# Patient Record
Sex: FEMALE | Race: WHITE | NOT HISPANIC OR LATINO | Employment: UNEMPLOYED | URBAN - NONMETROPOLITAN AREA
[De-identification: names, ages, dates, MRNs, and addresses within clinical notes are randomized per-mention and may not be internally consistent; named-entity substitution may affect disease eponyms.]

---

## 2017-02-23 ENCOUNTER — OFFICE VISIT (OUTPATIENT)
Dept: ORTHOPEDIC SURGERY | Facility: CLINIC | Age: 47
End: 2017-02-23

## 2017-02-23 DIAGNOSIS — M75.42 IMPINGEMENT SYNDROME OF BOTH SHOULDERS: Primary | ICD-10-CM

## 2017-02-23 DIAGNOSIS — M75.41 IMPINGEMENT SYNDROME OF BOTH SHOULDERS: Primary | ICD-10-CM

## 2017-02-23 DIAGNOSIS — G56.01 CARPAL TUNNEL SYNDROME OF RIGHT WRIST: ICD-10-CM

## 2017-02-23 PROCEDURE — 99213 OFFICE O/P EST LOW 20 MIN: CPT | Performed by: ORTHOPAEDIC SURGERY

## 2017-12-19 ENCOUNTER — OFFICE (AMBULATORY)
Dept: URBAN - METROPOLITAN AREA CLINIC 75 | Facility: CLINIC | Age: 47
End: 2017-12-19

## 2017-12-19 VITALS
SYSTOLIC BLOOD PRESSURE: 120 MMHG | HEART RATE: 80 BPM | DIASTOLIC BLOOD PRESSURE: 80 MMHG | HEIGHT: 60 IN | WEIGHT: 147 LBS

## 2017-12-19 DIAGNOSIS — R10.13 EPIGASTRIC PAIN: ICD-10-CM

## 2017-12-19 DIAGNOSIS — R11.0 NAUSEA: ICD-10-CM

## 2017-12-19 DIAGNOSIS — Z79.1 LONG TERM (CURRENT) USE OF NON-STEROIDAL ANTI-INFLAMMATORIES: ICD-10-CM

## 2017-12-19 DIAGNOSIS — R13.19 OTHER DYSPHAGIA: ICD-10-CM

## 2017-12-19 DIAGNOSIS — K58.9 IRRITABLE BOWEL SYNDROME WITHOUT DIARRHEA: ICD-10-CM

## 2017-12-19 PROCEDURE — 99202 OFFICE O/P NEW SF 15 MIN: CPT | Performed by: INTERNAL MEDICINE

## 2017-12-19 RX ORDER — ONDANSETRON HYDROCHLORIDE 8 MG/1
24 TABLET, FILM COATED ORAL
Qty: 60 | Refills: 3 | Status: COMPLETED
Start: 2017-12-19 | End: 2023-07-25

## 2018-02-15 ENCOUNTER — OFFICE VISIT (OUTPATIENT)
Dept: ORTHOPEDIC SURGERY | Facility: CLINIC | Age: 48
End: 2018-02-15

## 2018-02-15 DIAGNOSIS — M75.42 IMPINGEMENT SYNDROME OF BOTH SHOULDERS: ICD-10-CM

## 2018-02-15 DIAGNOSIS — M75.41 IMPINGEMENT SYNDROME OF BOTH SHOULDERS: ICD-10-CM

## 2018-02-15 DIAGNOSIS — M25.511 RIGHT SHOULDER PAIN, UNSPECIFIED CHRONICITY: Primary | ICD-10-CM

## 2018-02-15 PROCEDURE — 20610 DRAIN/INJ JOINT/BURSA W/O US: CPT | Performed by: ORTHOPAEDIC SURGERY

## 2018-02-15 PROCEDURE — 99213 OFFICE O/P EST LOW 20 MIN: CPT | Performed by: ORTHOPAEDIC SURGERY

## 2018-02-15 RX ORDER — GABAPENTIN 300 MG/1
300 CAPSULE ORAL
COMMUNITY
Start: 2018-01-29 | End: 2022-06-23

## 2018-02-15 RX ADMIN — LIDOCAINE HYDROCHLORIDE 2 ML: 10 INJECTION, SOLUTION EPIDURAL; INFILTRATION; INTRACAUDAL; PERINEURAL at 15:33

## 2018-02-15 RX ADMIN — METHYLPREDNISOLONE ACETATE 160 MG: 80 INJECTION, SUSPENSION INTRA-ARTICULAR; INTRALESIONAL; INTRAMUSCULAR; SOFT TISSUE at 15:33

## 2018-02-15 NOTE — PROGRESS NOTES
Chief Complaint   Patient presents with   • Right Shoulder - Follow-up           HPI the patient is here today for a follow up of her right shoulder. The patient states that she has quite a bit of pain and discomfort of the right shoulder.  She has weakness in abduction.  Cross body activities bother her significantly.  She uses a keyboard for prolonged periods of time during the course of her daily activities.  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  In fact the patient has been using a wheelchair for extended periods of time.  She does use her upper extremities to propel herself in the wheelchair and also to assist in transfers.  This puts a lot of pressure on both her shoulders.  The right shoulder is quite symptomatic at this point.  She states that the pain is sharp and stabbing in character and based over the lateral and posterior aspects of the shoulder.  She denies any history of trauma to the shoulder.  We have discussed about rotator cuff pathology with this patient in detail.        There were no vitals filed for this visit.        Review of Systems   HENT: Negative.    Eyes: Negative.    Respiratory: Negative.    Cardiovascular: Negative.    Gastrointestinal: Negative.    Endocrine: Negative.    Genitourinary: Negative.    Musculoskeletal: Positive for joint swelling.   Skin: Negative.    Allergic/Immunologic: Negative.    Neurological: Positive for speech difficulty and weakness.   Hematological: Negative.    Psychiatric/Behavioral: Negative.            Physical Exam   Constitutional: She is oriented to person, place, and time. She appears well-nourished.   HENT:   Head: Atraumatic.   Eyes: EOM are normal.   Neck: Neck supple.   Cardiovascular: Normal rate, regular rhythm, normal heart sounds and intact distal pulses.    Pulmonary/Chest: Effort normal and breath sounds normal.   Abdominal: Bowel sounds are normal.   Musculoskeletal: She exhibits edema and  tenderness.   Neurological: She is alert and oriented to person, place, and time. She displays abnormal reflex. She exhibits abnormal muscle tone. Coordination abnormal.   Skin: Skin is dry.   Psychiatric: She has a normal mood and affect. Her behavior is normal. Judgment and thought content normal.   Vitals reviewed.          Joint/Body Part Specific Exam:  right Shoulder. Patient has signs of impingement with internal and external rotation. There is a lot of pain and tenderness for the patient over the subcromial bursa. Jamil’s sign is positive. Neer sign is positive. Forward flexion is 0-110° degrees, abduction is 0-120 degrees, external rotation is 0-30° degrees. Rotator cuff function is fairly well preserved except for the impingement at 90 degrees. Apprehension sign is negative. Axillary nerve function is well preserved. Radial artery pulses are palpable. There is no evidence of multidirectional instability. Sulcus sign is negative. Drop arm sign is negative. The patient has some ill-defined tenderness over the greater tuberosity of the humerus. The pain level is 5.      X-RAY Report:        Diagnostics:        Lyly was seen today for follow-up.    Diagnoses and all orders for this visit:    Right shoulder pain, unspecified chronicity  -     Large Joint Arthrocentesis    Impingement syndrome of both shoulders          Large Joint Arthrocentesis  Date/Time: 2/15/2018 3:33 PM  Consent given by: patient  Supporting Documentation  Indications: pain   Procedure Details  Location: shoulder - R glenohumeral  Approach: posterior  Medications administered: 2 mL lidocaine PF 1% 1 %; 160 mg methylPREDNISolone acetate 80 MG/ML                Plan:  Jacket patient's right shoulder from an anteromedial approach with a steroid.    Stretching and strengthening exercises of the upper extremity to prevent a frozen shoulder.    Avoid repetitive loading of the shoulder to minimize the possibility of rotator cuff tendinitis and  a tear.    Falls precaution.    Tablet ibuprofen 600 mg orally daily at bedtime for pain swelling and discomfort.    Nonoperative care for the shoulder discussed with the patient in great detail.    Follow-up in my office in 6 months for reevaluation and possible reinjection.

## 2018-02-27 PROBLEM — M25.511 RIGHT SHOULDER PAIN: Status: ACTIVE | Noted: 2018-02-27

## 2018-02-27 RX ORDER — METHYLPREDNISOLONE ACETATE 80 MG/ML
160 INJECTION, SUSPENSION INTRA-ARTICULAR; INTRALESIONAL; INTRAMUSCULAR; SOFT TISSUE
Status: COMPLETED | OUTPATIENT
Start: 2018-02-15 | End: 2018-02-15

## 2018-02-27 RX ORDER — LIDOCAINE HYDROCHLORIDE 10 MG/ML
2 INJECTION, SOLUTION EPIDURAL; INFILTRATION; INTRACAUDAL; PERINEURAL
Status: COMPLETED | OUTPATIENT
Start: 2018-02-15 | End: 2018-02-15

## 2018-04-06 VITALS
OXYGEN SATURATION: 96 % | HEART RATE: 72 BPM | HEART RATE: 73 BPM | HEART RATE: 80 BPM | DIASTOLIC BLOOD PRESSURE: 65 MMHG | HEART RATE: 90 BPM | SYSTOLIC BLOOD PRESSURE: 113 MMHG | RESPIRATION RATE: 25 BRPM | RESPIRATION RATE: 17 BRPM | OXYGEN SATURATION: 98 % | RESPIRATION RATE: 14 BRPM | DIASTOLIC BLOOD PRESSURE: 41 MMHG | SYSTOLIC BLOOD PRESSURE: 97 MMHG | RESPIRATION RATE: 21 BRPM | SYSTOLIC BLOOD PRESSURE: 130 MMHG | SYSTOLIC BLOOD PRESSURE: 110 MMHG | SYSTOLIC BLOOD PRESSURE: 105 MMHG | RESPIRATION RATE: 15 BRPM | HEART RATE: 92 BPM | SYSTOLIC BLOOD PRESSURE: 150 MMHG | OXYGEN SATURATION: 99 % | WEIGHT: 147 LBS | DIASTOLIC BLOOD PRESSURE: 75 MMHG | RESPIRATION RATE: 10 BRPM | SYSTOLIC BLOOD PRESSURE: 128 MMHG | RESPIRATION RATE: 12 BRPM | DIASTOLIC BLOOD PRESSURE: 67 MMHG | RESPIRATION RATE: 19 BRPM | DIASTOLIC BLOOD PRESSURE: 79 MMHG | OXYGEN SATURATION: 93 % | HEART RATE: 75 BPM | SYSTOLIC BLOOD PRESSURE: 146 MMHG | HEART RATE: 89 BPM | DIASTOLIC BLOOD PRESSURE: 63 MMHG | HEART RATE: 86 BPM | SYSTOLIC BLOOD PRESSURE: 98 MMHG | OXYGEN SATURATION: 100 % | OXYGEN SATURATION: 94 % | DIASTOLIC BLOOD PRESSURE: 69 MMHG | TEMPERATURE: 97.6 F | RESPIRATION RATE: 20 BRPM | HEART RATE: 87 BPM | DIASTOLIC BLOOD PRESSURE: 62 MMHG | TEMPERATURE: 97 F | DIASTOLIC BLOOD PRESSURE: 57 MMHG | DIASTOLIC BLOOD PRESSURE: 64 MMHG | HEART RATE: 79 BPM | RESPIRATION RATE: 18 BRPM | OXYGEN SATURATION: 92 % | SYSTOLIC BLOOD PRESSURE: 100 MMHG | SYSTOLIC BLOOD PRESSURE: 112 MMHG | RESPIRATION RATE: 16 BRPM | SYSTOLIC BLOOD PRESSURE: 106 MMHG | SYSTOLIC BLOOD PRESSURE: 104 MMHG | DIASTOLIC BLOOD PRESSURE: 48 MMHG | HEIGHT: 60 IN | DIASTOLIC BLOOD PRESSURE: 42 MMHG | OXYGEN SATURATION: 97 % | HEART RATE: 84 BPM | OXYGEN SATURATION: 95 % | SYSTOLIC BLOOD PRESSURE: 99 MMHG | HEART RATE: 77 BPM

## 2018-04-09 ENCOUNTER — AMBULATORY SURGICAL CENTER (AMBULATORY)
Dept: URBAN - METROPOLITAN AREA SURGERY 17 | Facility: SURGERY | Age: 48
End: 2018-04-09

## 2018-04-09 ENCOUNTER — OFFICE (AMBULATORY)
Dept: URBAN - METROPOLITAN AREA PATHOLOGY 4 | Facility: PATHOLOGY | Age: 48
End: 2018-04-09

## 2018-04-09 DIAGNOSIS — R13.19 OTHER DYSPHAGIA: ICD-10-CM

## 2018-04-09 DIAGNOSIS — D12.5 BENIGN NEOPLASM OF SIGMOID COLON: ICD-10-CM

## 2018-04-09 DIAGNOSIS — K63.5 POLYP OF COLON: ICD-10-CM

## 2018-04-09 DIAGNOSIS — K21.0 GASTRO-ESOPHAGEAL REFLUX DISEASE WITH ESOPHAGITIS: ICD-10-CM

## 2018-04-09 DIAGNOSIS — K29.70 GASTRITIS, UNSPECIFIED, WITHOUT BLEEDING: ICD-10-CM

## 2018-04-09 DIAGNOSIS — D12.8 BENIGN NEOPLASM OF RECTUM: ICD-10-CM

## 2018-04-09 DIAGNOSIS — R93.3 ABNORMAL FINDINGS ON DIAGNOSTIC IMAGING OF OTHER PARTS OF DI: ICD-10-CM

## 2018-04-09 DIAGNOSIS — R11.0 NAUSEA: ICD-10-CM

## 2018-04-09 DIAGNOSIS — K57.30 DIVERTICULOSIS OF LARGE INTESTINE WITHOUT PERFORATION OR ABS: ICD-10-CM

## 2018-04-09 DIAGNOSIS — R10.13 EPIGASTRIC PAIN: ICD-10-CM

## 2018-04-09 DIAGNOSIS — K31.89 OTHER DISEASES OF STOMACH AND DUODENUM: ICD-10-CM

## 2018-04-09 DIAGNOSIS — K58.9 IRRITABLE BOWEL SYNDROME WITHOUT DIARRHEA: ICD-10-CM

## 2018-04-09 LAB
GI HISTOLOGY: A. UNSPECIFIED: (no result)
GI HISTOLOGY: B. UNSPECIFIED: (no result)
GI HISTOLOGY: C. UNSPECIFIED: (no result)
GI HISTOLOGY: D. UNSPECIFIED: (no result)
GI HISTOLOGY: E. UNSPECIFIED: (no result)
GI HISTOLOGY: F. UNSPECIFIED: (no result)
GI HISTOLOGY: G. UNSPECIFIED: (no result)
GI HISTOLOGY: PDF REPORT: (no result)

## 2018-04-09 PROCEDURE — 88305 TISSUE EXAM BY PATHOLOGIST: CPT | Performed by: INTERNAL MEDICINE

## 2018-04-09 PROCEDURE — 43239 EGD BIOPSY SINGLE/MULTIPLE: CPT | Performed by: INTERNAL MEDICINE

## 2018-04-09 PROCEDURE — 43450 DILATE ESOPHAGUS 1/MULT PASS: CPT | Performed by: INTERNAL MEDICINE

## 2018-04-09 PROCEDURE — 45385 COLONOSCOPY W/LESION REMOVAL: CPT | Performed by: INTERNAL MEDICINE

## 2018-04-09 PROCEDURE — 45380 COLONOSCOPY AND BIOPSY: CPT | Mod: 59 | Performed by: INTERNAL MEDICINE

## 2018-04-09 RX ADMIN — PROPOFOL 50 MG: 10 INJECTION, EMULSION INTRAVENOUS at 13:44

## 2018-04-09 RX ADMIN — PROPOFOL 50 MG: 10 INJECTION, EMULSION INTRAVENOUS at 13:41

## 2018-04-09 RX ADMIN — PROPOFOL 50 MG: 10 INJECTION, EMULSION INTRAVENOUS at 13:28

## 2018-04-09 RX ADMIN — LIDOCAINE HYDROCHLORIDE 25 MG: 10 INJECTION, SOLUTION EPIDURAL; INFILTRATION; INTRACAUDAL; PERINEURAL at 13:16

## 2018-04-09 RX ADMIN — PROPOFOL 50 MG: 10 INJECTION, EMULSION INTRAVENOUS at 13:21

## 2018-04-09 RX ADMIN — PROPOFOL 50 MG: 10 INJECTION, EMULSION INTRAVENOUS at 13:19

## 2018-04-09 RX ADMIN — PROPOFOL 100 MG: 10 INJECTION, EMULSION INTRAVENOUS at 13:16

## 2018-04-09 RX ADMIN — PROPOFOL 25 MG: 10 INJECTION, EMULSION INTRAVENOUS at 13:37

## 2018-04-09 RX ADMIN — PROPOFOL 50 MG: 10 INJECTION, EMULSION INTRAVENOUS at 13:18

## 2018-04-09 RX ADMIN — PROPOFOL 50 MG: 10 INJECTION, EMULSION INTRAVENOUS at 13:25

## 2018-04-09 RX ADMIN — PROPOFOL 25 MG: 10 INJECTION, EMULSION INTRAVENOUS at 13:32

## 2018-09-07 ENCOUNTER — HOSPITAL ENCOUNTER (EMERGENCY)
Facility: HOSPITAL | Age: 48
Discharge: PSYCHIATRIC OR SUBSTANCE USE FACILITY | End: 2018-09-07
Attending: FAMILY MEDICINE | Admitting: FAMILY MEDICINE

## 2018-09-07 ENCOUNTER — HOSPITAL ENCOUNTER (INPATIENT)
Facility: HOSPITAL | Age: 48
LOS: 10 days | Discharge: HOME HEALTH | End: 2018-09-17
Attending: SPECIALIST | Admitting: SPECIALIST

## 2018-09-07 VITALS
RESPIRATION RATE: 16 BRPM | WEIGHT: 138 LBS | DIASTOLIC BLOOD PRESSURE: 78 MMHG | BODY MASS INDEX: 26.06 KG/M2 | HEIGHT: 61 IN | SYSTOLIC BLOOD PRESSURE: 120 MMHG | OXYGEN SATURATION: 99 % | TEMPERATURE: 98.1 F | HEART RATE: 70 BPM

## 2018-09-07 DIAGNOSIS — F33.2 SEVERE EPISODE OF RECURRENT MAJOR DEPRESSIVE DISORDER, WITHOUT PSYCHOTIC FEATURES (HCC): Primary | ICD-10-CM

## 2018-09-07 PROBLEM — G11.11: Chronic | Status: ACTIVE | Noted: 2018-09-07

## 2018-09-07 PROBLEM — J44.9 COPD (CHRONIC OBSTRUCTIVE PULMONARY DISEASE) (HCC): Chronic | Status: ACTIVE | Noted: 2018-09-07

## 2018-09-07 PROBLEM — K21.9 GERD (GASTROESOPHAGEAL REFLUX DISEASE): Chronic | Status: ACTIVE | Noted: 2018-09-07

## 2018-09-07 LAB
ALBUMIN SERPL-MCNC: 4 G/DL (ref 3.5–5.2)
ALBUMIN/GLOB SERPL: 1.4 G/DL
ALP SERPL-CCNC: 100 U/L (ref 39–117)
ALT SERPL W P-5'-P-CCNC: 36 U/L (ref 1–33)
AMPHET+METHAMPHET UR QL: NEGATIVE
ANION GAP SERPL CALCULATED.3IONS-SCNC: 10.1 MMOL/L
AST SERPL-CCNC: 23 U/L (ref 1–32)
BARBITURATES UR QL SCN: NEGATIVE
BASOPHILS # BLD AUTO: 0.04 10*3/MM3 (ref 0–0.2)
BASOPHILS NFR BLD AUTO: 0.5 % (ref 0–1.5)
BENZODIAZ UR QL SCN: NEGATIVE
BILIRUB SERPL-MCNC: 0.3 MG/DL (ref 0.1–1.2)
BILIRUB UR QL STRIP: NEGATIVE
BUN BLD-MCNC: 8 MG/DL (ref 6–20)
BUN/CREAT SERPL: 15.4 (ref 7–25)
CALCIUM SPEC-SCNC: 9.5 MG/DL (ref 8.6–10.5)
CANNABINOIDS SERPL QL: POSITIVE
CHLORIDE SERPL-SCNC: 104 MMOL/L (ref 98–107)
CHOLEST SERPL-MCNC: 160 MG/DL (ref 0–200)
CLARITY UR: CLEAR
CO2 SERPL-SCNC: 25.9 MMOL/L (ref 22–29)
COCAINE UR QL: NEGATIVE
COLOR UR: YELLOW
CREAT BLD-MCNC: 0.52 MG/DL (ref 0.57–1)
DEPRECATED RDW RBC AUTO: 46.2 FL (ref 37–54)
EOSINOPHIL # BLD AUTO: 0.16 10*3/MM3 (ref 0–0.7)
EOSINOPHIL NFR BLD AUTO: 1.8 % (ref 0.3–6.2)
ERYTHROCYTE [DISTWIDTH] IN BLOOD BY AUTOMATED COUNT: 13.1 % (ref 11.7–13)
ETHANOL BLD-MCNC: <10 MG/DL (ref 0–10)
ETHANOL UR QL: <0.01 %
GFR SERPL CREATININE-BSD FRML MDRD: 125 ML/MIN/1.73
GLOBULIN UR ELPH-MCNC: 2.8 GM/DL
GLUCOSE BLD-MCNC: 96 MG/DL (ref 65–99)
GLUCOSE UR STRIP-MCNC: NEGATIVE MG/DL
HCT VFR BLD AUTO: 44.7 % (ref 35.6–45.5)
HDLC SERPL-MCNC: 50 MG/DL (ref 40–60)
HGB BLD-MCNC: 14.5 G/DL (ref 11.9–15.5)
HGB UR QL STRIP.AUTO: NEGATIVE
IMM GRANULOCYTES # BLD: 0 10*3/MM3 (ref 0–0.03)
IMM GRANULOCYTES NFR BLD: 0 % (ref 0–0.5)
KETONES UR QL STRIP: NEGATIVE
LDLC SERPL CALC-MCNC: 88 MG/DL (ref 0–100)
LDLC/HDLC SERPL: 1.76 {RATIO}
LEUKOCYTE ESTERASE UR QL STRIP.AUTO: NEGATIVE
LYMPHOCYTES # BLD AUTO: 2.9 10*3/MM3 (ref 0.9–4.8)
LYMPHOCYTES NFR BLD AUTO: 33.3 % (ref 19.6–45.3)
MCH RBC QN AUTO: 31.5 PG (ref 26.9–32)
MCHC RBC AUTO-ENTMCNC: 32.4 G/DL (ref 32.4–36.3)
MCV RBC AUTO: 97 FL (ref 80.5–98.2)
METHADONE UR QL SCN: NEGATIVE
MONOCYTES # BLD AUTO: 0.52 10*3/MM3 (ref 0.2–1.2)
MONOCYTES NFR BLD AUTO: 6 % (ref 5–12)
NEUTROPHILS # BLD AUTO: 5.1 10*3/MM3 (ref 1.9–8.1)
NEUTROPHILS NFR BLD AUTO: 58.4 % (ref 42.7–76)
NITRITE UR QL STRIP: NEGATIVE
OPIATES UR QL: NEGATIVE
OXYCODONE UR QL SCN: NEGATIVE
PH UR STRIP.AUTO: 7.5 [PH] (ref 5–8)
PLATELET # BLD AUTO: 275 10*3/MM3 (ref 140–500)
PMV BLD AUTO: 9.6 FL (ref 6–12)
POTASSIUM BLD-SCNC: 3.8 MMOL/L (ref 3.5–5.2)
PROT SERPL-MCNC: 6.8 G/DL (ref 6–8.5)
PROT UR QL STRIP: NEGATIVE
RBC # BLD AUTO: 4.61 10*6/MM3 (ref 3.9–5.2)
SODIUM BLD-SCNC: 140 MMOL/L (ref 136–145)
SP GR UR STRIP: <=1.005 (ref 1–1.03)
T4 FREE SERPL-MCNC: 1.78 NG/DL (ref 0.93–1.7)
TRIGL SERPL-MCNC: 110 MG/DL (ref 0–150)
TSH SERPL DL<=0.05 MIU/L-ACNC: 1.92 MIU/ML (ref 0.27–4.2)
UROBILINOGEN UR QL STRIP: NORMAL
VLDLC SERPL-MCNC: 22 MG/DL (ref 5–40)
WBC NRBC COR # BLD: 8.72 10*3/MM3 (ref 4.5–10.7)

## 2018-09-07 PROCEDURE — 90791 PSYCH DIAGNOSTIC EVALUATION: CPT | Performed by: SOCIAL WORKER

## 2018-09-07 PROCEDURE — 84443 ASSAY THYROID STIM HORMONE: CPT | Performed by: SPECIALIST

## 2018-09-07 PROCEDURE — 80307 DRUG TEST PRSMV CHEM ANLYZR: CPT | Performed by: FAMILY MEDICINE

## 2018-09-07 PROCEDURE — 99285 EMERGENCY DEPT VISIT HI MDM: CPT

## 2018-09-07 PROCEDURE — 80053 COMPREHEN METABOLIC PANEL: CPT | Performed by: FAMILY MEDICINE

## 2018-09-07 PROCEDURE — 81003 URINALYSIS AUTO W/O SCOPE: CPT | Performed by: SPECIALIST

## 2018-09-07 PROCEDURE — 84439 ASSAY OF FREE THYROXINE: CPT | Performed by: SPECIALIST

## 2018-09-07 PROCEDURE — 85025 COMPLETE CBC W/AUTO DIFF WBC: CPT | Performed by: FAMILY MEDICINE

## 2018-09-07 PROCEDURE — 80061 LIPID PANEL: CPT | Performed by: SPECIALIST

## 2018-09-07 RX ORDER — ALBUTEROL SULFATE 2.5 MG/3ML
2.5 SOLUTION RESPIRATORY (INHALATION) EVERY 6 HOURS PRN
Status: DISCONTINUED | OUTPATIENT
Start: 2018-09-07 | End: 2018-09-17 | Stop reason: HOSPADM

## 2018-09-07 RX ORDER — MELOXICAM 15 MG/1
15 TABLET ORAL DAILY
Status: DISCONTINUED | OUTPATIENT
Start: 2018-09-08 | End: 2018-09-17 | Stop reason: HOSPADM

## 2018-09-07 RX ORDER — GABAPENTIN 300 MG/1
300 CAPSULE ORAL NIGHTLY
Status: DISCONTINUED | OUTPATIENT
Start: 2018-09-07 | End: 2018-09-17 | Stop reason: HOSPADM

## 2018-09-07 RX ORDER — ALUMINA, MAGNESIA, AND SIMETHICONE 2400; 2400; 240 MG/30ML; MG/30ML; MG/30ML
15 SUSPENSION ORAL EVERY 6 HOURS PRN
Status: DISCONTINUED | OUTPATIENT
Start: 2018-09-07 | End: 2018-09-17 | Stop reason: HOSPADM

## 2018-09-07 RX ORDER — BACLOFEN 10 MG/1
10 TABLET ORAL 2 TIMES DAILY PRN
Status: DISCONTINUED | OUTPATIENT
Start: 2018-09-07 | End: 2018-09-17 | Stop reason: HOSPADM

## 2018-09-07 RX ORDER — ACETAMINOPHEN 500 MG
1000 TABLET ORAL ONCE
Status: COMPLETED | OUTPATIENT
Start: 2018-09-07 | End: 2018-09-07

## 2018-09-07 RX ORDER — ACETAMINOPHEN 325 MG/1
650 TABLET ORAL EVERY 4 HOURS PRN
Status: DISCONTINUED | OUTPATIENT
Start: 2018-09-07 | End: 2018-09-17 | Stop reason: HOSPADM

## 2018-09-07 RX ORDER — CLONAZEPAM 0.5 MG/1
0.5 TABLET ORAL 2 TIMES DAILY PRN
Status: DISCONTINUED | OUTPATIENT
Start: 2018-09-07 | End: 2018-09-09

## 2018-09-07 RX ADMIN — GABAPENTIN 300 MG: 300 CAPSULE ORAL at 21:51

## 2018-09-07 RX ADMIN — ACETAMINOPHEN 1000 MG: 500 TABLET, FILM COATED ORAL at 16:09

## 2018-09-07 RX ADMIN — CLONAZEPAM 0.5 MG: 0.5 TABLET ORAL at 21:51

## 2018-09-07 NOTE — ED PROVIDER NOTES
EMERGENCY DEPARTMENT ENCOUNTER    CHIEF COMPLAINT  Chief Complaint: SI  History given by: Patient  History limited by: None  Room Number: 09/09  PMD: Trinity Queen MD      HPI:  Pt is a 48 y.o. female who presents complaining of SI which started over the past year but worsened today. Pt also reports plan by overdose and a hx of depression for 13 years as well as a hx of spinocerebellar degeneration, chronic back pain due to osteoporosis and osteoarthritis, and long term loss of blance and coordination. She also notes being wheelchair bound which worsens her depression. Pt reports and that she began seeing a therapist after a series of loss of family members but that she currently is not being counseled. Pt also notes recent nausea, diminished appetite, and insomnia over the past few weeks.     Duration:  >1 year  Onset: Gradual  Timing: Constant  Progression: Unchanged  Associated Symptoms: Nausea, difficulty sleeping, and difficulty eating over the past few weeks.  Aggravating Factors: Wheelchair bound, loss of family memebers  Alleviating Factors: None  Previous Episodes: Pt has a hx of depression for 13 years as well as a hx of spinal cerebelluar degeneration, chronic back pain due to osteoporosis and osteoarthritis, and long term loss of blance and coordination.  Treatment before arrival: Pt is not currently being counseled    PAST MEDICAL HISTORY  Active Ambulatory Problems     Diagnosis Date Noted   • Carpal tunnel syndrome of right wrist 05/17/2016   • Tendinitis of thumb 05/17/2016   • Impingement syndrome of both shoulders 05/17/2016   • Right shoulder pain 02/27/2018     Resolved Ambulatory Problems     Diagnosis Date Noted   • No Resolved Ambulatory Problems     Past Medical History:   Diagnosis Date   • Arthritis    • Asthma    • Ataxia    • COPD (chronic obstructive pulmonary disease) (CMS/[facility])    • Depression    • GERD (gastroesophageal reflux disease)    • Learning disability    • Panic  attacks        PAST SURGICAL HISTORY  Past Surgical History:   Procedure Laterality Date   • BREAST SURGERY      CALCIUM NODULE REMOVED LEFT   • EYE MUSCLE SURGERY Right 8/5/2016    Procedure: RIGHT EYE MEDIAL RECTUS RECESSION;  Surgeon: John Kirkpatrick MD;  [place] [facility] [place] [facility] [place]  Service:    • TONSILLECTOMY     • TOOTH EXTRACTION       FAMILY HISTORY  Family History   Problem Relation Age of Onset   • Arthritis Mother    • Asthma Mother    • Cancer Father    • Alcohol abuse Father    • Arthritis Father    • Arthritis Maternal Aunt    • Cancer Paternal Aunt    • Arthritis Paternal Aunt    • Diabetes Paternal Aunt      SOCIAL HISTORY  Social History     Social History   • Marital status:      Spouse name: N/A   • Number of children: N/A   • Years of education: N/A     Occupational History   • Not on file.     Social History Main Topics   • Smoking status: Current Every Day Smoker     Packs/day: 1.00     Years: 28.00   • Smokeless tobacco: Never Used   • Alcohol use No   • Drug use: Unknown   • Sexual activity: Not on file     Other Topics Concern   • Not on file     Social History Narrative    ** Merged History Encounter **          ALLERGIES  Ampicillin and Aspirin    REVIEW OF SYSTEMS  Review of Systems   Constitutional: Positive for appetite change (diminished). Negative for fever.        Insomnia   Respiratory: Negative for shortness of breath.    Cardiovascular: Negative for chest pain.   Gastrointestinal: Positive for nausea.   Musculoskeletal: Positive for back pain (chronic).   Psychiatric/Behavioral: Positive for suicidal ideas.       PHYSICAL EXAM  ED Triage Vitals [09/07/18 1442]   Temp Heart Rate Resp BP SpO2   98.1 °F (36.7 °C) 105 18 122/81 95 %      Temp src Heart Rate Source Patient Position BP Location FiO2 (%)   -- -- -- -- --     Physical Exam   Constitutional: She is oriented to person, place, and time. No distress.   Eyes: EOM are normal.   Neck: Normal range of motion.    Cardiovascular: Normal rate and regular rhythm.    Pulmonary/Chest: Effort normal and breath sounds normal. No respiratory distress.   Neurological: She is alert and oriented to person, place, and time.   Finger to nose dimetria left hand>right hand   Skin: Skin is warm and dry.   Psychiatric: Affect normal. She exhibits a depressed mood. She expresses suicidal ideation. She expresses suicidal plans.   Nursing note and vitals reviewed.    LABS:  Results for orders placed or performed during the hospital encounter of 09/07/18   Comprehensive Metabolic Panel   Result Value Ref Range    Glucose 96 65 - 99 mg/dL    BUN 8 6 - 20 mg/dL    Creatinine 0.52 (L) 0.57 - 1.00 mg/dL    Sodium 140 136 - 145 mmol/L    Potassium 3.8 3.5 - 5.2 mmol/L    Chloride 104 98 - 107 mmol/L    CO2 25.9 22.0 - 29.0 mmol/L    Calcium 9.5 8.6 - 10.5 mg/dL    Total Protein 6.8 6.0 - 8.5 g/dL    Albumin 4.00 3.50 - 5.20 g/dL    ALT (SGPT) 36 (H) 1 - 33 U/L    AST (SGOT) 23 1 - 32 U/L    Alkaline Phosphatase 100 39 - 117 U/L    Total Bilirubin 0.3 0.1 - 1.2 mg/dL    eGFR Non African Amer 125 >60 mL/min/1.73    Globulin 2.8 gm/dL    A/G Ratio 1.4 g/dL    BUN/Creatinine Ratio 15.4 7.0 - 25.0    Anion Gap 10.1 mmol/L   Urine Drug Screen - Urine, Clean Catch   Result Value Ref Range    Amphet/Methamphet, Screen Negative Negative    Barbiturates Screen, Urine Negative Negative    Benzodiazepine Screen, Urine Negative Negative    Cocaine Screen, Urine Negative Negative    Opiate Screen Negative Negative    THC, Screen, Urine Positive (A) Negative    Methadone Screen, Urine Negative Negative    Oxycodone Screen, Urine Negative Negative   Ethanol   Result Value Ref Range    Ethanol <10 0 - 10 mg/dL    Ethanol % <0.010 %   CBC Auto Differential   Result Value Ref Range    WBC 8.72 4.50 - 10.70 10*3/mm3    RBC 4.61 3.90 - 5.20 10*6/mm3    Hemoglobin 14.5 11.9 - 15.5 g/dL    Hematocrit 44.7 35.6 - 45.5 %    MCV 97.0 80.5 - 98.2 fL    MCH 31.5 26.9 - 32.0  pg    MCHC 32.4 32.4 - 36.3 g/dL    RDW 13.1 (H) 11.7 - 13.0 %    RDW-SD 46.2 37.0 - 54.0 fl    MPV 9.6 6.0 - 12.0 fL    Platelets 275 140 - 500 10*3/mm3    Neutrophil % 58.4 42.7 - 76.0 %    Lymphocyte % 33.3 19.6 - 45.3 %    Monocyte % 6.0 5.0 - 12.0 %    Eosinophil % 1.8 0.3 - 6.2 %    Basophil % 0.5 0.0 - 1.5 %    Immature Grans % 0.0 0.0 - 0.5 %    Neutrophils, Absolute 5.10 1.90 - 8.10 10*3/mm3    Lymphocytes, Absolute 2.90 0.90 - 4.80 10*3/mm3    Monocytes, Absolute 0.52 0.20 - 1.20 10*3/mm3    Eosinophils, Absolute 0.16 0.00 - 0.70 10*3/mm3    Basophils, Absolute 0.04 0.00 - 0.20 10*3/mm3    Immature Grans, Absolute 0.00 0.00 - 0.03 10*3/mm3     PROCEDURES  Procedures    PROGRESS AND CONSULTS   1520-Checked patient and discussed plan to order Access evaluation. Pt understands and agrees with the plan, all questions answered.    1526-Ordered Access evaluation.     1604-Ordered tylenol.     1704-Rechecked patient who denies alcohol use. Ordered blood work ad urine drug screen.    1800-Access has seen the patient who agrees to be discharged to behavior health.     MEDICAL DECISION MAKING  Results were reviewed/discussed with the patient and they were also made aware of online access. Pt also made aware that some labs, such as cultures, will not be resulted during ER visit and follow up with PMD is necessary.     MDM  Number of Diagnoses or Management Options  Severe episode of recurrent major depressive disorder, without psychotic features (CMS/HCC):      Amount and/or Complexity of Data Reviewed  Clinical lab tests: reviewed and ordered (EtOH<0.010, THC urine screen=positive)    Patient Progress  Patient progress: stable       DIAGNOSIS  Final diagnoses:   Severe episode of recurrent major depressive disorder, without psychotic features (CMS/HCC)     DISPOSITION  Patient is being discharged to Behavioral Health.     Latest Documented Vital Signs:  As of 7:58 PM  BP- 118/79 HR- 66 Temp- 98.1 °F (36.7 °C) O2  sat- 98%    --  Documentation assistance provided by angela Saunders for Dr. Darling.  Information recorded by the piliibmelissa was done at my direction and has been verified and validated by me.                Mellissa Saunders  09/07/18 2001       Anthony Darling MD  09/07/18 4094

## 2018-09-07 NOTE — ED NOTES
Report received from ALLAN Powers. Oseas,RN from intake at bedside.     Venecia Jones RN  09/07/18 1927

## 2018-09-07 NOTE — PLAN OF CARE
Problem: Suicidal Behavior (Adult)  Goal: Suicidal Behavior is Absent/Minimized/Managed  Outcome: Ongoing (interventions implemented as appropriate)   09/07/18 1927   OTHER   Action Step/Short Term Goal (STG) Established 09/07/18   Suicidal Behavior is Absent/Minimized/Managed   Suicidal Behavior Managed/Minimized Time Frame for Action Step (STG) 4 days   Suicidal Behavior Managed/Minimized Action Step (STG) Outcome making progress toward outcome

## 2018-09-07 NOTE — CONSULTS
"Access evaluated pt. Pt tearful and flat. Pt states she has had worsening of depressed mood in past year.Pt states she feels \"hopeless\" and wonders if \"dying\" is the best solution. She states she has decreased concentration, poor appetite, poor sleep and has been hearing things (doorbells and telephones) for the past month. 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 She states she is in a wheelchair and is able to do her own ADL's. Pt rates both her depressed mood and anxiety at a \"10.\" Pt states she has seen a therapist in the past but struggles [place]. She last saw a new therapist a couple of months ago. Pt states she has tried several anti depressants but she was struggling with the side effects, the worst of which was double vision. Pt states she tried Lexapro for two weeks and stopped it last month. She states she had taken Remeron several years ago and did not seem to remember side effects. Pt states she started having suicidal thoughts last week. She states she was considering taking an od on her clonopin but called her doctor instead. The dr sent over a Visiting Nurse who recommended pt come to ED to be assessed. Pt denies drinking alcohol but admits smoking pot, at times to ,increase her appetite.Pt states she smokes cigarettes. Pt states she moved to [place] with her boyfriend but has been \"very isolated\" and states he does not get her to the doctor when she needs to go. Pt has put herself on a waiting list for the [facility], assisted living in [place]. She states she plans to end her relationship. Pt states  her parents and brother  within 4 mos of each other 13 yrs ago. Pt to be admitted to [facility]. Dr Wagner and ED dr in agreement. Pt in agreement.    Pt was  for less than two yrs at age 23 until her   from suicide. She states he drank and did drugs. Pt has no children.   "

## 2018-09-08 PROCEDURE — 94640 AIRWAY INHALATION TREATMENT: CPT

## 2018-09-08 RX ORDER — MIRTAZAPINE 15 MG/1
15 TABLET, FILM COATED ORAL NIGHTLY
Status: DISCONTINUED | OUTPATIENT
Start: 2018-09-08 | End: 2018-09-17 | Stop reason: HOSPADM

## 2018-09-08 RX ADMIN — ALBUTEROL SULFATE 2.5 MG: 2.5 SOLUTION RESPIRATORY (INHALATION) at 13:56

## 2018-09-08 RX ADMIN — MIRTAZAPINE 15 MG: 15 TABLET, FILM COATED ORAL at 22:47

## 2018-09-08 RX ADMIN — MELOXICAM 15 MG: 15 TABLET ORAL at 14:41

## 2018-09-08 RX ADMIN — GABAPENTIN 300 MG: 300 CAPSULE ORAL at 22:48

## 2018-09-08 RX ADMIN — CLONAZEPAM 0.5 MG: 0.5 TABLET ORAL at 14:17

## 2018-09-08 NOTE — PROGRESS NOTES
DAILY PROGRESS NOTE  [facility]    Patient Identification:  Name: Lyly Zeng  Age: 48 y.o.  Sex: female  :  1970  MRN: [id number]         Primary Care Physician: Trinity Queen MD    Subjective:  Interval History:She complains of palpitations and some joint pain.    Objective:    Scheduled Meds:  gabapentin 300 mg Oral Nightly   meloxicam 15 mg Oral Daily   mirtazapine 15 mg Oral Nightly     Continuous Infusions:     Vital signs in last 24 hours:  Temp:  [98 °F (36.7 °C)-98.2 °F (36.8 °C)] 98 °F (36.7 °C)  Heart Rate:  [66-80] 80  Resp:  [14-18] 18  BP: (108-132)/(67-82) 108/67    Intake/Output:    Intake/Output Summary (Last 24 hours) at 18 1508  Last data filed at 18 1308   Gross per 24 hour   Intake              480 ml   Output                0 ml   Net              480 ml       Exam:  /67 (BP Location: Right arm, Patient Position: Sitting)   Pulse 80   Temp 98 °F (36.7 °C) (Oral)   Resp 18   SpO2 95%     General Appearance:    Alert, cooperative, no distress   Head:    Normocephalic, without obvious abnormality, atraumatic   Eyes:       Throat:   Lips, tongue, gums normal   Neck:   Supple, symmetrical, trachea midline, no JVD   Lungs:     Clear to auscultation bilaterally, respirations unlabored   Chest Wall:    No tenderness or deformity    Heart:    Regular rate and rhythm, S1 and S2 normal, no murmur,no  Rub or gallop   Abdomen:     Soft, non-tender, bowel sounds active, no masses, no organomegaly    Extremities:   Extremities normal, atraumatic, no cyanosis or edema   Pulses:      Skin:   Skin is warm and dry,  no rashes or palpable lesions   Neurologic:   She is weak in wheelchair.      Lab Results (last 72 hours)     Procedure Component Value Units Date/Time    T4, Free [[id number]]  (Abnormal) Collected:  18    Specimen:  Blood Updated:  18     Free T4 1.78 (H) ng/dL     TSH [[id number]]  (Normal) Collected:  18     Specimen:  Blood Updated:  09/07/18 2225     TSH 1.920 mIU/mL     Lipid Panel [[id number]] Collected:  09/07/18 1719    Specimen:  Blood Updated:  09/07/18 2220     Total Cholesterol 160 mg/dL      Triglycerides 110 mg/dL      HDL Cholesterol 50 mg/dL      LDL Cholesterol  88 mg/dL      VLDL Cholesterol 22 mg/dL      LDL/HDL Ratio 1.76    Narrative:       Cholesterol Reference Ranges  (U.S. Department of Health and Human Services ATP III Classifications)    Desirable          <200 mg/dL  Borderline High    200-239 mg/dL  High Risk          >240 mg/dL      Triglyceride Reference Ranges  (U.S. Department of Health and Human Services ATP III Classifications)    Normal           <150 mg/dL  Borderline High  150-199 mg/dL  High             200-499 mg/dL  Very High        >500 mg/dL    HDL Reference Ranges  (U.S. Department of Health and Human Services ATP III Classifcations)    Low     <40 mg/dl (major risk factor for CHD)  High    >60 mg/dl ('negative' risk factor for CHD)        LDL Reference Ranges  (U.S. Department of Health and Human Services ATP III Classifcations)    Optimal          <100 mg/dL  Near Optimal     100-129 mg/dL  Borderline High  130-159 mg/dL  High             160-189 mg/dL  Very High        >189 mg/dL    Urinalysis With Microscopic If Indicated (No Culture) - Urine, Clean Catch [[id number]]  (Normal) Collected:  09/07/18 1720    Specimen:  Urine from Urine, Clean Catch Updated:  09/07/18 2123     Color, UA Yellow     Appearance, UA Clear     pH, UA 7.5     Specific Gravity, UA <=1.005     Glucose, UA Negative     Ketones, UA Negative     Bilirubin, UA Negative     Blood, UA Negative     Protein, UA Negative     Leuk Esterase, UA Negative     Nitrite, UA Negative     Urobilinogen, UA 0.2 E.U./dL    Narrative:       Urine microscopic not indicated.        Data Review:    Results from last 7 days  Lab Units 09/07/18  1719   SODIUM mmol/L 140   POTASSIUM mmol/L 3.8   CHLORIDE mmol/L 104   CO2 mmol/L 25.9    BUN mg/dL 8   CREATININE mg/dL 0.52*   GLUCOSE mg/dL 96   CALCIUM mg/dL 9.5       Results from last 7 days  Lab Units 09/07/18  1719   WBC 10*3/mm3 8.72   HEMOGLOBIN g/dL 14.5   HEMATOCRIT % 44.7   PLATELETS 10*3/mm3 275       Results from last 7 days  Lab Units 09/07/18  1719   TSH mIU/mL 1.920   FREE T4 ng/dL 1.78*         No results found for: TROPONINT    Results from last 7 days  Lab Units 09/07/18  1719   CHOLESTEROL mg/dL 160   TRIGLYCERIDES mg/dL 110   HDL CHOL mg/dL 50   LDL CHOL mg/dL 88       Results from last 7 days  Lab Units 09/07/18  1719   ALK PHOS U/L 100   BILIRUBIN mg/dL 0.3   ALT (SGPT) U/L 36*   AST (SGOT) U/L 23       Results from last 7 days  Lab Units 09/07/18  1719   TSH mIU/mL 1.920   FREE T4 ng/dL 1.78*         No results found for: POCGLU        Patient Active Problem List   Diagnosis Code   • Carpal tunnel syndrome of right wrist G56.01   • Tendinitis of thumb M77.8   • Impingement syndrome of both shoulders M75.41, M75.42   • Right shoulder pain M25.511   • Severe recurrent major depression without psychotic features (CMS/HCC) F33.2   • COPD (chronic obstructive pulmonary disease) (CMS/HCC) J44.9   • GERD (gastroesophageal reflux disease) K21.9   • 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       Plan:   Continue with current RX. Appears medically stable. Will sign off. Call if problems.    Ho Mcmillan MD  9/8/2018  3:08 PM

## 2018-09-08 NOTE — CONSULTS
Inpatient Hospitalist Consult  Consult performed by: MELLISA NGUYEN  Consult ordered by: YOLETTE ROMAN III  Reason for consult: COPD, tobacco abuse, GERD, Yifan's ataxia          Patient Care Team:  Trinity Queen MD as PCP - General (Internal Medicine)  Trinity Queen MD    Chief complaint: depressed    Subjective     Very pleasant 49yo woman admitted to [facility] for worsening depression and SI. We are asked to see for medical evaluation. She has h/o Yifan's ataxia diagnosed at 14yo. She has chronic eye issues and is wheelchair bound as a result. She is presently comfortable and her only complaint is that the soup isn't very good. She does continue to smoke cigarettes and is treated for COPD. Currently no respiratory complaints.        Review of Systems   Constitutional: Positive for fatigue. Negative for appetite change, chills, diaphoresis and fever.   HENT: Negative for ear pain, facial swelling, hearing loss, mouth sores, nosebleeds, rhinorrhea, sinus pressure, sore throat, tinnitus, trouble swallowing and voice change.    Eyes: Negative for pain and visual disturbance.   Respiratory: Negative for cough, choking, chest tightness, shortness of breath and stridor.    Cardiovascular: Negative for chest pain, palpitations and leg swelling.   Gastrointestinal: Negative for abdominal pain, blood in stool, diarrhea, nausea and vomiting.   Endocrine: Negative for cold intolerance and heat intolerance.   Genitourinary: Negative for decreased urine volume, difficulty urinating, dysuria and hematuria.   Musculoskeletal: Negative for back pain and neck pain.   Skin: Negative for color change, pallor and rash.   Allergic/Immunologic: Negative for environmental allergies, food allergies and immunocompromised state.   Neurological: Negative for tremors, seizures, syncope, facial asymmetry, speech difficulty, light-headedness, numbness and headaches.   Hematological: Negative for  adenopathy. Does not bruise/bleed easily.   Psychiatric/Behavioral: Positive for dysphoric mood and suicidal ideas. Negative for behavioral problems, confusion and hallucinations.        Past Medical History:   Diagnosis Date   • Arthritis    • Asthma    • Ataxia     DOES NOT HAVE GOOD BALANCE   • COPD (chronic obstructive pulmonary disease) (CMS/HCC)    • Depression    • GERD (gastroesophageal reflux disease)    • Learning disability    • Panic attacks    ,   Past Surgical History:   Procedure Laterality Date   • BREAST SURGERY      CALCIUM NODULE REMOVED LEFT   • EYE MUSCLE SURGERY Right 8/5/2016    Procedure: RIGHT EYE MEDIAL RECTUS RECESSION;  Surgeon: John Kirkpatrick MD;  Location: [facility] OR [facility] [place];  Service:    • TONSILLECTOMY     • TOOTH EXTRACTION     ,   Family History   Problem Relation Age of Onset   • Arthritis Mother    • Asthma Mother    • Cancer Father    • Alcohol abuse Father    • Arthritis Father    • Arthritis Maternal Aunt    • Cancer Paternal Aunt    • Arthritis Paternal Aunt    • Diabetes Paternal Aunt    ,   Social History   Substance Use Topics   • Smoking status: Current Every Day Smoker     Packs/day: 1.00     Years: 28.00   • Smokeless tobacco: Never Used   • Alcohol use No   ,   Prescriptions Prior to Admission   Medication Sig Dispense Refill Last Dose   • albuterol (PROVENTIL HFA;VENTOLIN HFA) 108 (90 BASE) MCG/ACT inhaler Inhale 2 puffs 4 (four) times a day.   8/5/2016 at 0600   • baclofen (LIORESAL) 10 MG tablet Take 10 mg by mouth 2 (two) times a day as needed for muscle spasms.   8/4/2016 at 1800   • clonazePAM (KlonoPIN) 1 MG tablet Take 0.5 mg by mouth 2 (Two) Times a Day As Needed for Anxiety.   8/5/2016 at 0600   • gabapentin (NEURONTIN) 300 MG capsule 300 mg every night at bedtime.      • gabapentin (NEURONTIN) 600 MG tablet Take 600 mg by mouth 2 (two) times a day.   8/4/2016 at 1800   • meloxicam (MOBIC) 15 MG tablet Take 15 mg by mouth daily.   8/4/2016 at 1200   • PROAIR HFA  108 (90 BASE) MCG/ACT inhaler       , Scheduled Meds:      gabapentin 300 mg Oral Nightly   [START ON 9/8/2018] meloxicam 15 mg Oral Daily   , PRN Meds:  •  acetaminophen  •  albuterol  •  aluminum-magnesium hydroxide-simethicone  •  baclofen  •  clonazePAM  •  magnesium hydroxide and Allergies:  Ampicillin and Aspirin    Objective      Vital Signs  Temp:  [98.1 °F (36.7 °C)] 98.1 °F (36.7 °C)  Heart Rate:  [] 70  Resp:  [14-18] 16  BP: (118-122)/(72-82) 120/78    Physical Exam   Constitutional: She is oriented to person, place, and time. She appears well-developed and well-nourished. No distress.   HENT:   Head: Normocephalic and atraumatic.   Mouth/Throat: Oropharynx is clear and moist. No oropharyngeal exudate.   Eyes: Pupils are equal, round, and reactive to light. Conjunctivae are normal. Right eye exhibits no discharge. Left eye exhibits no discharge. No scleral icterus.   Neck: Normal range of motion. Neck supple. No thyromegaly present.   Cardiovascular: Normal rate, regular rhythm, normal heart sounds and intact distal pulses.    No murmur heard.  Pulmonary/Chest: Effort normal and breath sounds normal. No respiratory distress.   Abdominal: Soft. Bowel sounds are normal. She exhibits no distension. There is no tenderness.   Musculoskeletal: She exhibits edema (trace, chronic, BLEs).   Lymphadenopathy:     She has no cervical adenopathy.   Neurological: She is alert and oriented to person, place, and time. No cranial nerve deficit or sensory deficit.   Skin: Skin is warm and dry. Capillary refill takes less than 2 seconds. No rash noted. She is not diaphoretic. No erythema.   Psychiatric: Her behavior is normal. Thought content normal.   Flat affect   Nursing note and vitals reviewed.      Results Review:    I reviewed the patient's new clinical results.  I reviewed the patient's other test results and agree with the interpretation  Discussed with patient and RN        Assessment/Plan     Principal  Problem:    Severe recurrent major depression without psychotic features (CMS/[facility])  Active Problems:    COPD (chronic obstructive pulmonary disease) (CMS/[facility])    GERD (gastroesophageal reflux disease)    Hereditary ataxia of Friedreich (CMS/[facility])          Plan:   (Thanks for consult!  Pt's labs and vitals are reassuring  Lung exam fine  Continue home meds  Will see as needed--please notify us of any new medical issues  ).       I discussed the patients findings and my recommendations with patient and nursing staff    Leroy Rashid MD  09/07/18  10:21 PM    Time: 30min

## 2018-09-08 NOTE — H&P
IDENTIFYING INFORMATION: The patient is a 48-year-old white female admitted with increasing depression and suicidal thinking.    CHIEF COMPLAINT:  None given    INFORMANT:  Patient and chart    RELIABILITY:  Good    HISTORY OF PRESENT ILLNESS: The patient is a 48-year-old white female admitted to the crisis management unit after reporting positive suicidal ideation with plan to overdose as well as increasing depression.  The patient reports that her depression has worsened over the last year.  Patient reports multiple stressors.  She has lived with a man for the past 17 years and she disapproves of his constant use of marijuana.  The patient also reports a history of Friedreich's ataxia and is wheelchair-bound.  She has a history of previous trials of Lexapro Prozac and trintellix but reports that she has not tolerated these medications having only taken for 2-3 weeks secondary to adverse side effects.  The patient reports occasional use of cannabis, she denies abuse of other psychoactive substances.  She has a history of 1 previous suicide attempt, this having taken place approximately 8 years ago.  [facility] was [facility] at that time.  The patient complains of chronically poor sleep and loss of appetite.  She denies any history of psychotic symptoms.  She is currently able to promise safety in the hospital.    PAST PSYCHIATRIC HISTORY: As above    PAST MEDICAL HISTORY:  As above    MEDICATIONS:   Prescriptions Prior to Admission   Medication Sig Dispense Refill Last Dose   • albuterol (PROVENTIL HFA;VENTOLIN HFA) 108 (90 BASE) MCG/ACT inhaler Inhale 2 puffs 4 (four) times a day.   8/5/2016 at 0600   • baclofen (LIORESAL) 10 MG tablet Take 10 mg by mouth 2 (two) times a day as needed for muscle spasms.   8/4/2016 at 1800   • clonazePAM (KlonoPIN) 1 MG tablet Take 0.5 mg by mouth 2 (Two) Times a Day As Needed for Anxiety.   8/5/2016 at 0600   • gabapentin (NEURONTIN) 300 MG capsule 300 mg  every night at bedtime.      • gabapentin (NEURONTIN) 600 MG tablet Take 600 mg by mouth 2 (two) times a day.   8/4/2016 at 1800   • meloxicam (MOBIC) 15 MG tablet Take 15 mg by mouth daily.   8/4/2016 at 1200   • PROAIR  (90 BASE) MCG/ACT inhaler             ALLERGIES:  Aspirin, ampicillin    FAMILY HISTORY:  The patient's former  committed suicide at the age of 23 she reports no history of blood relatives with psychiatric illness.    SOCIAL HISTORY: The patient lives with her boyfriend.  She reports substance use as noted previously.  She completed high school.    MENTAL STATUS EXAM: The patient is a thin wheelchair-bound white female appearing her stated age.  She is no apparent physical distress of times examination.  She is awake alert and oriented all spheres.  Her mood is dysphoric her affect blunted.  Speech is relevant and coherent.  There are no deficits memory cognition noted.  Intelligence is judged to be in the average range based on fund of knowledge, the patient's cooperative throughout the interview.  She is currently endorsing positive suicidal elation but is able to promise safety in the hospital.  She denies homicidal elation or psychotic features.  Her judgment and insight appear to be intact.    ASSETS/LIABILITIES: Motivation for change/health issues    DIAGNOSTIC IMPRESSION: Major depressive disorder recurrent moderate, Friedreich's ataxia, cannabis use disorder    PLAN:  The patient remains hospital as her safety stabilization.  Given her prominent neurovegetative symptoms I will begin a trial of Remeron 15 mg at bedtime.  Additionally, individual, couples and chemical dependence evaluations will take place.  The patient is able to promise safety in the hospital and will remain on low risk suicide precautions.  Estimated length stay in the hospital 5-7 days.

## 2018-09-08 NOTE — PLAN OF CARE
Problem: Patient Care Overview  Goal: Plan of Care Review  Outcome: Ongoing (interventions implemented as appropriate)   09/08/18 1303   OTHER   Outcome Summary PT COMPLIANT WITH TREATMENT. SHE VOICED NO SI/HI, HALLUCINATIONS OR DELUSIONS THIS MORNING. ANXIETY OF 4 AND DEPRESSION OF 8 REPORTED THIS MORNING. PT USES A WHEELCHAIR FOR AMBULATIONS AND TRANFERS. SHE IS A MINIMAL ASSIST X1 WITH ALL ADL'S . PT NOTED TO BE ALERT AND ORIENTED X3. ATTENDING GROUPS. NO FALLS REPORTED. APPROPRIATE USE OF WHEELCHAIR NOTED. WILL CONTINUE TO MONITOR PT AND NOTE ANY CHANGES.   Plan of Care Review   Progress improving   Coping/Psychosocial   Plan of Care Reviewed With patient   Coping/Psychosocial   Patient Agreement with Plan of Care agrees

## 2018-09-08 NOTE — PLAN OF CARE
Problem: Patient Care Overview  Goal: Plan of Care Review  Outcome: Ongoing (interventions implemented as appropriate)   09/08/18 0511   OTHER   Outcome Summary Pt admitted from ER with c/o worsening SI over last year and worse over last week with plan to OD. Notified MD instead who sent out a home health nurse who recommended coming to the ER. Pt uses wheelchair but is compliant with ADLS. Has appeared to sleep 5 hours so far, coop with meds and care. No attempts to harm self or others, no SI voiced.    Plan of Care Review   Progress no change       Problem: Overarching Goals (Adult)  Goal: Adheres to Safety Considerations for Self and Others  Outcome: Ongoing (interventions implemented as appropriate)    Goal: Optimized Coping Skills in Response to Life Stressors  Outcome: Ongoing (interventions implemented as appropriate)    Goal: Develops/Participates in Therapeutic [place] to Support Successful Transition  Outcome: Ongoing (interventions implemented as appropriate)      Problem: Suicidal Behavior (Adult)  Goal: Suicidal Behavior is Absent/Minimized/Managed  Outcome: Ongoing (interventions implemented as appropriate)      Problem: Skin Injury Risk (Adult)  Goal: Skin Health and Integrity  Outcome: Ongoing (interventions implemented as appropriate)

## 2018-09-09 RX ORDER — CLONAZEPAM 0.5 MG/1
0.5 TABLET ORAL 2 TIMES DAILY
Status: DISCONTINUED | OUTPATIENT
Start: 2018-09-09 | End: 2018-09-11

## 2018-09-09 RX ADMIN — MIRTAZAPINE 15 MG: 15 TABLET, FILM COATED ORAL at 22:03

## 2018-09-09 RX ADMIN — BACLOFEN 10 MG: 10 TABLET ORAL at 22:04

## 2018-09-09 RX ADMIN — GABAPENTIN 300 MG: 300 CAPSULE ORAL at 22:03

## 2018-09-09 RX ADMIN — MELOXICAM 15 MG: 15 TABLET ORAL at 09:58

## 2018-09-09 RX ADMIN — CLONAZEPAM 0.5 MG: 0.5 TABLET ORAL at 22:03

## 2018-09-09 NOTE — PLAN OF CARE
Problem: Patient Care Overview  Goal: Plan of Care Review  Outcome: Ongoing (interventions implemented as appropriate)   09/09/18 0515 09/09/18 0538   OTHER   Outcome Summary --  Voiced anxiety, but did not rate. Depression rated 10/10. No SI/HI. Cooperative and med compliant. Will continue to monitor and assess.    Plan of Care Review   Progress --  no change   Coping/Psychosocial   Plan of Care Reviewed With patient --    Coping/Psychosocial   Patient Agreement with Plan of Care agrees --        Problem: Overarching Goals (Adult)  Goal: Adheres to Safety Considerations for Self and Others  Outcome: Ongoing (interventions implemented as appropriate)    Goal: Optimized Coping Skills in Response to Life Stressors  Outcome: Ongoing (interventions implemented as appropriate)    Goal: Develops/Participates in Therapeutic [place] to Support Successful Transition  Outcome: Ongoing (interventions implemented as appropriate)      Problem: Suicidal Behavior (Adult)  Goal: Suicidal Behavior is Absent/Minimized/Managed  Outcome: Outcome(s) achieved Date Met: 09/09/18      Problem: Skin Injury Risk (Adult)  Goal: Identify Related Risk Factors and Signs and Symptoms  Outcome: Ongoing (interventions implemented as appropriate)   09/09/18 0538   Skin Injury Risk (Adult)   Related Risk Factors (Skin Injury Risk) mobility impaired     Goal: Skin Health and Integrity  Outcome: Ongoing (interventions implemented as appropriate)

## 2018-09-09 NOTE — PLAN OF CARE
Problem: Patient Care Overview  Goal: Plan of Care Review  Outcome: Ongoing (interventions implemented as appropriate)   09/09/18 6466   OTHER   Outcome Summary PT COMPLIANT WITH MEDICATIONS AND TREATMENT TODAY. SHE VOICED NO SI/HI, HALLUCINATIONS OR DELUSIONS THIS MORNING. ANXIETY AND DEPRESSION OF 0 REPORTED THIS MORNING. CONTINUES TO USES HER WHEELCHAIR APPROPRIATELY. MINIMAL ASSIST X1 WITH ALL ADL'S. ALERT AND ORIENTED X3. ATTENDED GROUPS. NO FALLS REPORTED. CONTINUES TO NEED THE SITTER FOR SAFETY REASONS. WILL CONTINUE TO MONITOR PT AND NOTE ANY CHANGES.   Plan of Care Review   Progress improving   Coping/Psychosocial   Plan of Care Reviewed With patient   Coping/Psychosocial   Patient Agreement with Plan of Care agrees       Problem: Skin Injury Risk (Adult)  Goal: Identify Related Risk Factors and Signs and Symptoms  Outcome: Ongoing (interventions implemented as appropriate)    Goal: Skin Health and Integrity  Outcome: Ongoing (interventions implemented as appropriate)

## 2018-09-09 NOTE — PROGRESS NOTES
The patient slept well with initiation of Remeron but complaints of daytime anxiety.  I will add scheduled clonazepam 0.5 mg twice daily in hopes of addressing the patient's complaints of daytime anxiety.  He continues complain of dysphoric mood and hopelessness.

## 2018-09-10 RX ADMIN — CLONAZEPAM 0.5 MG: 0.5 TABLET ORAL at 10:11

## 2018-09-10 RX ADMIN — MELOXICAM 15 MG: 15 TABLET ORAL at 09:54

## 2018-09-10 RX ADMIN — MAGNESIUM HYDROXIDE 10 ML: 2400 SUSPENSION ORAL at 21:09

## 2018-09-10 RX ADMIN — GABAPENTIN 300 MG: 300 CAPSULE ORAL at 21:09

## 2018-09-10 RX ADMIN — MIRTAZAPINE 15 MG: 15 TABLET, FILM COATED ORAL at 21:09

## 2018-09-10 RX ADMIN — CLONAZEPAM 0.5 MG: 0.5 TABLET ORAL at 21:09

## 2018-09-10 RX ADMIN — ACETAMINOPHEN 650 MG: 325 TABLET ORAL at 10:11

## 2018-09-10 RX ADMIN — BACLOFEN 10 MG: 10 TABLET ORAL at 21:09

## 2018-09-10 NOTE — PLAN OF CARE
Problem: Patient Care Overview  Goal: Discharge Needs Assessment  Outcome: Ongoing (interventions implemented as appropriate)   09/10/18 1044 09/10/18 1047   Discharge Needs Assessment   Readmission Within the Last 30 Days no previous admission in last 30 days --    Concerns to be Addressed decision making;coping/stress;medication;mental health;suicidal;substance/tobacco abuse/use;compliance issue;grief and loss --    Patient/Family Anticipates Transition to home --    Patient/Family Anticipated Services at Transition mental health services;outpatient care --    Transportation Concerns public transportation, does not know how to access --    Transportation Anticipated family or friend will provide --    Discharge Coordination/Progress --  Pt will return home at discharge. Pt will receive a  consult and 1:1 session. SW will explore outpatient providers for mental health providers prior to discharge.

## 2018-09-10 NOTE — PLAN OF CARE
Problem: Patient Care Overview  Goal: Plan of Care Review  Outcome: Ongoing (interventions implemented as appropriate)   09/10/18 0429 09/10/18 0719   OTHER   Outcome Summary --  Denied anxiety, depression, SI/HI, and hallucinations. Cooperative and med compliant. Will continue to monitor and assess.    Plan of Care Review   Progress --  improving   Coping/Psychosocial   Plan of Care Reviewed With patient --    Coping/Psychosocial   Patient Agreement with Plan of Care agrees --        Problem: Overarching Goals (Adult)  Goal: Adheres to Safety Considerations for Self and Others  Outcome: Ongoing (interventions implemented as appropriate)    Goal: Optimized Coping Skills in Response to Life Stressors  Outcome: Ongoing (interventions implemented as appropriate)    Goal: Develops/Participates in Therapeutic [place] to Support Successful Transition  Outcome: Ongoing (interventions implemented as appropriate)      Problem: Skin Injury Risk (Adult)  Goal: Identify Related Risk Factors and Signs and Symptoms  Outcome: Ongoing (interventions implemented as appropriate)

## 2018-09-10 NOTE — CONSULTS
"Reviewed chart and interviewed pt. Educated pt that MJ use increases anxiety in the long run as pt using it \"for anxiety\" Educated pt on brain chemistry of addiction and MJ as addictive as any other substance. Discussed use of CBD oil for pain as pt states she smokes MJ for pain as well. Motivational interviewing to help pt see how much damage MJ has done in her life as forgetfulness and confusion increasing. Pt states she would like to quit. Recommend some type of LOREN treatment and pt is willing to consider. Pt processed that her mother and she did not know how addictive benzodiazapine's were and that her mother used to give them to her as a child. \"She would tell me to take it and go lay down\" \"I was taking up to 5 pills a day at a young age\"   "

## 2018-09-10 NOTE — PROGRESS NOTES
The patient complains of dysphoric mood and feelings of lightheadedness this morning.  She appears quite flat.  She is reporting no suicidal elation but continues to express feelings of hopelessness.  She is at least sleeping well with initiation of Remeron.  Because of her frequent use of cannabis, a CD consultation has been ordered.

## 2018-09-10 NOTE — PROGRESS NOTES
"Continued Stay Note  [facility]     Patient Name: Lyly Zeng  MRN: [id number]  Today's Date: 9/10/2018    Admit Date: 9/7/2018          Discharge Plan     Row Name 09/10/18 9577       Plan    Plan Comments SW attempted to meet with pt, but she stated that her \"head was spinning\", which she believed was a medication side effect. Pt's nurse was consulted. SW will resume meeting with pt at a later time to discuss treatment.     Row Name 09/10/18 2418       Plan    Plan Pt will return home at discharge. Pt will receive a  consult and 1:1 session. SW will explore outpatient providers for mental health providers prior to discharge.     Patient/Family in Agreement with Plan yes              Discharge Codes    No documentation.           Amanda Villavicencio LCSW    "

## 2018-09-10 NOTE — PLAN OF CARE
Problem: Patient Care Overview  Goal: Plan of Care Review  Outcome: Ongoing (interventions implemented as appropriate)   09/10/18 4424   OTHER   Outcome Summary PT COMPLIANT WITH HER MEDICATIONS THIS SHIFT. SHE VOICED NO SI/HI, HALLUCINATIONS OR DELUSIONS. CONTINUES TO BE ON THE 1:1 FOR SAFETY REASONS. ANXIETY OF 5 AND DEPRESSION OF 9 REPORTED THIS MORNING. VOICING DIZZINESS AND LACK OF CONCENTRATION THIS MORNING. MD AWARE OF CHANGE IN PT STATUS- PLEASE SEE ORDERS FOR ANY CHANGES IN MEDICATION REGIMEN. CONTINUES TO USE HER WHEELCHAIR APPROPRIATELY. MINIMAL ASSIST X1 WITH ALL ADL'S. ALERT AND ORIENTED X3. ATTENDED ALL GROUPS TODAY. NO FALLS REPORTED. WILL CONTINUE TO MONITOR PT AND NOTE ANY CHANGES.   Plan of Care Review   Progress improving   Coping/Psychosocial   Plan of Care Reviewed With patient   Coping/Psychosocial   Patient Agreement with Plan of Care agrees

## 2018-09-10 NOTE — PLAN OF CARE
Problem: Patient Care Overview  Goal: Individualization and Mutuality  Outcome: Ongoing (interventions implemented as appropriate)    Goal: Interprofessional Rounds/Family Conf  Outcome: Ongoing (interventions implemented as appropriate)   09/10/18 1047   Interdisciplinary Rounds/Family Conf   Summary Treatment team met to create plan of care. Pt will receive a  consult and 1:1 session. SW will assess pt's discharge needs prior to discharge.    Interdisciplinary Rounds/Family Conf   Participants art therapy;;psychiatrist;social work;nursing     Patient/Guardian Signature: __________________________________            Psychiatrist Signature: ______________________________________             Therapist Signature: ________________________________________         Nurse Signature: ___________________________________________          Staff Signature: ____________________________________________            Staff Signature: ____________________________________________          Staff Signature: ____________________________________________          Staff Signature:                                                                                                      Problem: Suicidal Behavior (Adult)  Goal: Suicidal Behavior is Absent/Minimized/Managed  Outcome: Ongoing (interventions implemented as appropriate)

## 2018-09-10 NOTE — CONSULTS
Pt talked openly about grief issues. Discussed living situation and need her desire to change her situation to a more positive situation. Discussed omer resources available to her and how those resources can be helpful. Pt requested prayer and prayer offered according to pt needs.

## 2018-09-11 RX ORDER — CLONAZEPAM 0.5 MG/1
0.5 TABLET ORAL 3 TIMES DAILY
Status: DISCONTINUED | OUTPATIENT
Start: 2018-09-11 | End: 2018-09-17 | Stop reason: HOSPADM

## 2018-09-11 RX ADMIN — CLONAZEPAM 0.5 MG: 0.5 TABLET ORAL at 17:20

## 2018-09-11 RX ADMIN — GABAPENTIN 300 MG: 300 CAPSULE ORAL at 22:00

## 2018-09-11 RX ADMIN — CLONAZEPAM 0.5 MG: 0.5 TABLET ORAL at 22:00

## 2018-09-11 RX ADMIN — MIRTAZAPINE 15 MG: 15 TABLET, FILM COATED ORAL at 22:00

## 2018-09-11 RX ADMIN — MELOXICAM 15 MG: 15 TABLET ORAL at 09:37

## 2018-09-11 RX ADMIN — CLONAZEPAM 0.5 MG: 0.5 TABLET ORAL at 09:37

## 2018-09-11 NOTE — PROGRESS NOTES
"Patient continues to complain of significant daytime anxiety but is sleeping with initiation of Remeron.  She is active within the therapeutic milieu, but continues to report dysphoric mood.  She states that \"I can't go back to [place]\".  I will discuss with social work staff the patient's feelings regarding this but have attempted to redirect the patient's expectations regarding our ability to arrange housing for her after discharge.  "

## 2018-09-11 NOTE — PLAN OF CARE
Problem: Skin Injury Risk (Adult)  Goal: Identify Related Risk Factors and Signs and Symptoms  Outcome: Outcome(s) achieved Date Met: 09/11/18 09/11/18 9261   Skin Injury Risk (Adult)   Related Risk Factors (Skin Injury Risk) mobility impaired

## 2018-09-11 NOTE — SIGNIFICANT NOTE
Therapist met with pt for 1:1 session. Pt was cooperative and engaged in session, making good eye contact throughout. Pt processed recent phone conversation with her sister and feeling upset with the outcome. Since their mother's death, pt reports that her sister has not been very supportive and tries to tell her what to do. Pt also processed that her boyfriend (David) does the same thing and can be very controlling. Pt reflected on how this has been an issue for some time and coming to the realization that she no longer wants to be with David. Pt was tearful as she processed caring for him but no longer feeling in love with him. Pt demonstrated insight as she shared of difficulties in life and generational patterns she did not want to continue. Pt reflected on what she wanted her life to be and how she wanted to be happy. Pt described happiness as having her own place and people in her life to talk to and support her. Pt reported feeling isolated and not able to do what she wanted without having someone say something about it in her current living situation. Pt wishes to move to [facility] and having someone help her in the home. Pt reported feeling that overall she could take care of herself and felt she would be okay, despite everything she has been through. Therapist processed with pt using reflective listening and open-ended questioning. Therapist offered feedback regarding family and relationship dynamics. Therapist highlighted pt's insight and awareness. Therapist offered encouragement and support. Therapist explored potential safety issues and inquired about SI. Pt reported no significant concerns and reported no SI. Pt would like resources for support groups. Therapist noted this and will notify treatment team. Therapist closed with continued support. Pt was appreciative of session.

## 2018-09-11 NOTE — PLAN OF CARE
Problem: Patient Care Overview  Goal: Plan of Care Review  Outcome: Ongoing (interventions implemented as appropriate)   09/11/18 0116 09/11/18 0348   OTHER   Outcome Summary --  Pt has been compliant with medication this shift. Pt rates anxiety 4/10, depression 8/10, denies pain, SI, HI and hallucinations. Will continue to monitor.   Plan of Care Review   Progress --  improving   Coping/Psychosocial   Plan of Care Reviewed With patient --    Coping/Psychosocial   Patient Agreement with Plan of Care --  agrees       Problem: Overarching Goals (Adult)  Goal: Adheres to Safety Considerations for Self and Others  Outcome: Ongoing (interventions implemented as appropriate)    Goal: Optimized Coping Skills in Response to Life Stressors  Outcome: Ongoing (interventions implemented as appropriate)    Goal: Develops/Participates in Therapeutic [place] to Support Successful Transition  Outcome: Ongoing (interventions implemented as appropriate)      Problem: Suicidal Behavior (Adult)  Goal: Suicidal Behavior is Absent/Minimized/Managed  Outcome: Ongoing (interventions implemented as appropriate)      Problem: Skin Injury Risk (Adult)  Goal: Identify Related Risk Factors and Signs and Symptoms  Outcome: Ongoing (interventions implemented as appropriate)

## 2018-09-11 NOTE — PLAN OF CARE
Problem: Skin Injury Risk (Adult)  Goal: Identify Related Risk Factors and Signs and Symptoms  Outcome: Ongoing (interventions implemented as appropriate)   09/11/18 5930   Skin Injury Risk (Adult)   Related Risk Factors (Skin Injury Risk) mobility impaired

## 2018-09-11 NOTE — PROGRESS NOTES
Continued Stay Note  [facility]     Patient Name: Lyly Zeng  MRN: [id number]  Today's Date: 9/11/2018    Admit Date: 9/7/2018          Discharge Plan     Row Name 09/11/18 1230       Plan    Plan Comments SW met with pt to review treatment. Pt stated that she is currently on the waiting list for [facility], assisted living and she is hoping to end her relationship with her current boyfriend. SW briefly reviewed financial resources for other independent living/assisted living facilities and pt's financial resources are limited for these levels of care. SW will explore rates on independent living apartments, based on pt's affordability in [place], [place]. She stated that she would like to continue meeting with her new therapist, Ann Coombs and is open to receiving home health to monitor ongoing medical and psychiatric needs following discharge from [facility]. SW will secure follow-up prior to discharge.   **SW left voice messages for [place] [facility] [phone] and [facility] [phone] regarding rates for independent living level of care.. ASHLEY awaits responses from both communities.              Discharge Codes    No documentation.           Amanda Villavicencio LCSW

## 2018-09-11 NOTE — PLAN OF CARE
"Problem: Patient Care Overview  Goal: Plan of Care Review  Outcome: Ongoing (interventions implemented as appropriate)   09/11/18 1430 09/11/18 1752   OTHER   Outcome Summary --  Pt has been compliant with medications and cooperative with care. She denies SI, HI and hallucinations, but does report \"some\" anxiety and depression. She has participated in groups this shift. She remains with 1:1 for safety. Will continue to monitior pt for safety and any change in condition.   Plan of Care Review   Progress --  improving   Coping/Psychosocial   Plan of Care Reviewed With patient --    Coping/Psychosocial   Patient Agreement with Plan of Care agrees --        Problem: Overarching Goals (Adult)  Goal: Adheres to Safety Considerations for Self and Others  Outcome: Ongoing (interventions implemented as appropriate)   09/11/18 1752   Overarching Goals (Adult)   Adheres to Safety Considerations for Self and Others making progress toward outcome     Intervention: Develop and Maintain Individualized Safety Plan   09/11/18 1430 09/11/18 1600   C-SSRS (Recent)   Wish to be Dead no --    Suicidal Thoughts no --    Suicidal Thought with Method No Plan/Intent no --    Suicidal Intent (without Specific Plan) no --    Suicide Intent with Specific Plan no --    Suicide Behavior no --    Violence Risk   Feels Like Hurting Others no --    Previous Attempt to Harm Others no --    Develop and Maintain Individualized Safety Plan   Safety Measures --  safety rounds completed       Goal: Optimized Coping Skills in Response to Life Stressors  Outcome: Ongoing (interventions implemented as appropriate)   09/11/18 1752   Overarching Goals (Adult)   Optimized Coping Skills in Response to Life Stressors making progress toward outcome     Intervention: Promote Effective Coping Strategies   09/11/18 1430   Coping/Psychosocial Interventions   Supportive Measures active listening utilized;verbalization of feelings encouraged;positive reinforcement " provided;self-care encouraged       Goal: Develops/Participates in Therapeutic [place] to Support Successful Transition  Outcome: Ongoing (interventions implemented as appropriate)   09/11/18 1752   Overarching Goals (Adult)   Develops/Participates in Therapeutic [place] to Support Successful Transition making progress toward outcome     Intervention: Foster Therapeutic [place]   09/11/18 1430   Interventions   Trust Relationship/Rapport care explained;choices provided;thoughts/feelings acknowledged;reassurance provided;questions encouraged;questions answered     Intervention: Mutually Develop Transition Plan   09/11/18 1430   Mutually Develop Transition Plan   Transition Support crisis management plan promoted         Problem: Suicidal Behavior (Adult)  Goal: Suicidal Behavior is Absent/Minimized/Managed  Outcome: Ongoing (interventions implemented as appropriate)   09/11/18 1752   OTHER   Action Step/Short Term Goal (STG) Established 09/11/18   Suicidal Behavior is Absent/Minimized/Managed   Suicidal Behavior Managed/Minimized Time Frame for Action Step (STG) 4 days   Suicidal Behavior Managed/Minimized Action Step (STG) Outcome making progress toward outcome       Problem: Skin Injury Risk (Adult)  Goal: Skin Health and Integrity  Outcome: Ongoing (interventions implemented as appropriate)   09/11/18 1752   Skin Injury Risk (Adult)   Skin Health and Integrity making progress toward outcome

## 2018-09-12 PROCEDURE — 94799 UNLISTED PULMONARY SVC/PX: CPT

## 2018-09-12 RX ADMIN — MELOXICAM 15 MG: 15 TABLET ORAL at 08:59

## 2018-09-12 RX ADMIN — CLONAZEPAM 0.5 MG: 0.5 TABLET ORAL at 08:59

## 2018-09-12 RX ADMIN — MIRTAZAPINE 15 MG: 15 TABLET, FILM COATED ORAL at 21:52

## 2018-09-12 RX ADMIN — GABAPENTIN 300 MG: 300 CAPSULE ORAL at 21:52

## 2018-09-12 RX ADMIN — CLONAZEPAM 0.5 MG: 0.5 TABLET ORAL at 21:52

## 2018-09-12 RX ADMIN — ALBUTEROL SULFATE 2.5 MG: 2.5 SOLUTION RESPIRATORY (INHALATION) at 09:29

## 2018-09-12 RX ADMIN — BACLOFEN 10 MG: 10 TABLET ORAL at 21:52

## 2018-09-12 RX ADMIN — CLONAZEPAM 0.5 MG: 0.5 TABLET ORAL at 15:32

## 2018-09-12 NOTE — DISCHARGE PLACEMENT REQUEST
"Lyly Zeng (48 y.o. Female)     Date of Birth Social Security Number Address Home Phone MRN    1970  [address] [place] [id number] [phone] [id number]    [employer] Marital Status          Yarsani        Admission Date Admission Type Admitting Provider Attending Provider Department, Room/Bed    9/7/18 Emergency Immanuel Wagner III, MD B[place]ver, Charles Brook III, MD [facility] CRISIS MGMT, 3329/1    Discharge Date Discharge Disposition Discharge Destination                       Attending Provider:  Immanuel Wagner III, MD    Allergies:  Ampicillin, Aspirin    Isolation:  None   Infection:  None   Code Status:  CPR    Ht:  154.9 cm (61\")   Wt:  62.6 kg (138 lb)    Admission Cmt:  None   Principal Problem:  Severe recurrent major depression without psychotic features (CMS/HCC) [F33.2]                 Active Insurance as of 9/7/2018     Primary Coverage     Payor Plan Insurance Group Employer/Plan Group    MEDICARE RAILROAD MEDICARE      Payor Plan Address Payor Plan Phone Number Effective From Effective To    [address] [phone] 9/1/2004     [place] [id number]       Subscriber Name Subscriber Birth Date Member ID       LYLY ZENG 1970 [id number]                 Emergency Contacts      (Rel.) Home Phone Work Phone Mobile Phone    Cristal Austin (Sister) [phone] -- [phone]    Modesta Taylor (Sister) -- -- [phone]            "

## 2018-09-12 NOTE — PLAN OF CARE
Problem: Patient Care Overview  Goal: Plan of Care Review  Outcome: Ongoing (interventions implemented as appropriate)   09/12/18 0356 09/12/18 1015 09/12/18 1659   OTHER   Outcome Summary --  --  Pt has been cooperative with care and compliant with medications. Pt reports some anxiety and depression. She denies SI/HI and hallucinations. She has attended groups and is social in milieu. Will continue to monitor pt for safety and any change in condition.   Plan of Care Review   Progress improving --  --    Coping/Psychosocial   Plan of Care Reviewed With --  patient --    Coping/Psychosocial   Patient Agreement with Plan of Care --  agrees --        Problem: Overarching Goals (Adult)  Goal: Adheres to Safety Considerations for Self and Others  Outcome: Ongoing (interventions implemented as appropriate)   09/12/18 1659   Overarching Goals (Adult)   Adheres to Safety Considerations for Self and Others making progress toward outcome     Intervention: Develop and Maintain Individualized Safety Plan   09/12/18 1015 09/12/18 1600   C-SSRS (Recent)   Wish to be Dead no --    Suicidal Thoughts no --    Suicidal Thought with Method No Plan/Intent no --    Suicidal Intent (without Specific Plan) no --    Suicide Intent with Specific Plan no --    Suicide Behavior no --    Violence Risk   Feels Like Hurting Others no --    Previous Attempt to Harm Others no --    Develop and Maintain Individualized Safety Plan   Safety Measures --  safety rounds completed       Goal: Optimized Coping Skills in Response to Life Stressors  Outcome: Ongoing (interventions implemented as appropriate)   09/12/18 1659   Overarching Goals (Adult)   Optimized Coping Skills in Response to Life Stressors making progress toward outcome     Intervention: Promote Effective Coping Strategies   09/12/18 1015   Coping/Psychosocial Interventions   Supportive Measures active listening utilized;verbalization of feelings encouraged;self-care encouraged;positive  reinforcement provided       Goal: Develops/Participates in Therapeutic [place] to Support Successful Transition  Outcome: Ongoing (interventions implemented as appropriate)   09/12/18 1659   Overarching Goals (Adult)   Develops/Participates in Therapeutic [place] to Support Successful Transition making progress toward outcome     Intervention: Foster Therapeutic [place]   09/12/18 1015   Interventions   Trust Relationship/Rapport care explained;thoughts/feelings acknowledged;questions encouraged;reassurance provided;choices provided     Intervention: Mutually Develop Transition Plan   09/12/18 1015   Mutually Develop Transition Plan   Transition Support crisis management plan promoted         Problem: Skin Injury Risk (Adult)  Goal: Skin Health and Integrity  Outcome: Ongoing (interventions implemented as appropriate)   09/12/18 1659   Skin Injury Risk (Adult)   Skin Health and Integrity making progress toward outcome

## 2018-09-12 NOTE — PROGRESS NOTES
Continued Stay Note  [facility]     Patient Name: Lyly Zeng  MRN: [id number]  Today's Date: 9/12/2018    Admit Date: 9/7/2018          Discharge Plan     Row Name 09/12/18 1120       Plan    Plan Comments Per Dr. Wagner's progress note, pt expressed interest in transitioning to short-term rehab at a skilled nursing facility following discharge from [facility]. However, pt is not receiving PT and OT while on [facility].  will consult with Dr. Wagner about this request during the next treatment team meeting.              Discharge Codes    No documentation.           Amanda Villavicencio LCSW

## 2018-09-12 NOTE — PLAN OF CARE
Problem: Patient Care Overview  Goal: Plan of Care Review  Outcome: Ongoing (interventions implemented as appropriate)   09/12/18 0115 09/12/18 0356   OTHER   Outcome Summary --  Anxiety rated 2/10, depression 5/10, and no SI/HI. Pt out in milieu socializing with peers. Cooperative and med compliant. Will continue to monitor and assess.   Plan of Care Review   Progress --  improving   Coping/Psychosocial   Plan of Care Reviewed With patient --    Coping/Psychosocial   Patient Agreement with Plan of Care agrees --        Problem: Overarching Goals (Adult)  Goal: Adheres to Safety Considerations for Self and Others  Outcome: Ongoing (interventions implemented as appropriate)    Goal: Optimized Coping Skills in Response to Life Stressors  Outcome: Ongoing (interventions implemented as appropriate)    Goal: Develops/Participates in Therapeutic [place] to Support Successful Transition  Outcome: Ongoing (interventions implemented as appropriate)      Problem: Suicidal Behavior (Adult)  Goal: Suicidal Behavior is Absent/Minimized/Managed  Outcome: Outcome(s) achieved Date Met: 09/12/18      Problem: Skin Injury Risk (Adult)  Goal: Skin Health and Integrity  Outcome: Ongoing (interventions implemented as appropriate)

## 2018-09-12 NOTE — PROGRESS NOTES
The patient seems brighter and is more active within the therapeutic milieu.  She denies suicidal thinking.  She is expressing interest in a possible rehabilitation stent following her discharge from the hospital.  I will discuss this with social work.

## 2018-09-13 PROCEDURE — 97535 SELF CARE MNGMENT TRAINING: CPT | Performed by: OCCUPATIONAL THERAPIST

## 2018-09-13 PROCEDURE — 97162 PT EVAL MOD COMPLEX 30 MIN: CPT

## 2018-09-13 PROCEDURE — 97110 THERAPEUTIC EXERCISES: CPT

## 2018-09-13 PROCEDURE — 97165 OT EVAL LOW COMPLEX 30 MIN: CPT | Performed by: OCCUPATIONAL THERAPIST

## 2018-09-13 RX ADMIN — MELOXICAM 15 MG: 15 TABLET ORAL at 09:21

## 2018-09-13 RX ADMIN — ACETAMINOPHEN 650 MG: 325 TABLET ORAL at 09:21

## 2018-09-13 RX ADMIN — CLONAZEPAM 0.5 MG: 0.5 TABLET ORAL at 09:21

## 2018-09-13 RX ADMIN — ACETAMINOPHEN 650 MG: 325 TABLET ORAL at 14:41

## 2018-09-13 RX ADMIN — CLONAZEPAM 0.5 MG: 0.5 TABLET ORAL at 20:30

## 2018-09-13 RX ADMIN — GABAPENTIN 300 MG: 300 CAPSULE ORAL at 20:30

## 2018-09-13 RX ADMIN — BACLOFEN 10 MG: 10 TABLET ORAL at 09:21

## 2018-09-13 RX ADMIN — MIRTAZAPINE 15 MG: 15 TABLET, FILM COATED ORAL at 20:30

## 2018-09-13 RX ADMIN — CLONAZEPAM 0.5 MG: 0.5 TABLET ORAL at 17:09

## 2018-09-13 NOTE — PROGRESS NOTES
"The patient reports that she is feeling \"a little worse today\" and continues to express feelings of hopelessness.  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  "

## 2018-09-13 NOTE — DISCHARGE PLACEMENT REQUEST
"Lyly Zeng (48 y.o. Female)     Date of Birth Social Security Number Address Home Phone MRN    1970  [address] [place] [address] [phone] [id number]    Latter day Marital Status          Holiness        Admission Date Admission Type Admitting Provider Attending Provider Department, Room/Bed    9/7/18 Emergency Immanuel Wagner III, MD [place]ver, Charles Brook III, MD [facility] CRISIS MGMT, 3329/1    Discharge Date Discharge Disposition Discharge Destination                       Attending Provider:  Immanuel Wagner III, MD    Allergies:  Ampicillin, Aspirin    Isolation:  None   Infection:  None   Code Status:  CPR    Ht:  154.9 cm (61\")   Wt:  62.6 kg (138 lb)    Admission Cmt:  None   Principal Problem:  Severe recurrent major depression without psychotic features (CMS/HCC) [F33.2]                 Active Insurance as of 9/7/2018     Primary Coverage     Payor Plan Insurance Group Employer/Plan Group    MEDICARE RAILROAD MEDICARE      Payor Plan Address Payor Plan Phone Number Effective From Effective To    [address] [phone] 9/1/2004     [place] [id number]       Subscriber Name Subscriber Birth Date Member ID       LYLY ZENG 1970 [id number]                 Emergency Contacts      (Rel.) Home Phone Work Phone Mobile Phone    Cristal Austin (Sister) [phone] -- [phone]    Modesta Taylor (Sister) -- -- [phone]              "

## 2018-09-13 NOTE — PLAN OF CARE
"Problem: Patient Care Overview  Goal: Plan of Care Review  Outcome: Ongoing (interventions implemented as appropriate)   09/13/18 0930 09/13/18 1147 09/13/18 1626   OTHER   Outcome Summary --  --  Pt voiced \"feeling a little better\" rating depression 7/10 d/t recent issues with BF. Pt rated neck pain 8/10, PRN Baclofen given at 0921 and tylenol given at 1441, effective. Pt cooperative with care/groups this shift. Pt stated \"feeling less hopeless.\" Denied SI/HI, A/VH, and anxiety. No further C/O at this time. Will continue to monitor and provide safe environment.    Plan of Care Review   Progress --  --  improving   Coping/Psychosocial   Plan of Care Reviewed With --  patient --    Coping/Psychosocial   Patient Agreement with Plan of Care agrees --  --        Problem: Overarching Goals (Adult)  Goal: Adheres to Safety Considerations for Self and Others  Outcome: Ongoing (interventions implemented as appropriate)   09/13/18 1626   Overarching Goals (Adult)   Adheres to Safety Considerations for Self and Others making progress toward outcome     Intervention: Develop and Maintain Individualized Safety Plan   09/13/18 0930 09/13/18 1600   C-SSRS (Recent)   Wish to be Dead no --    Suicidal Thoughts no --    Suicidal Thought with Method No Plan/Intent no --    Suicidal Intent (without Specific Plan) no --    Suicide Intent with Specific Plan no --    Suicide Behavior no --    Violence Risk   Feels Like Hurting Others no --    Previous Attempt to Harm Others no --    Develop and Maintain Individualized Safety Plan   Safety Measures --  safety rounds completed       Goal: Optimized Coping Skills in Response to Life Stressors  Outcome: Ongoing (interventions implemented as appropriate)   09/13/18 1626   Overarching Goals (Adult)   Optimized Coping Skills in Response to Life Stressors making progress toward outcome     Intervention: Promote Effective Coping Strategies   09/13/18 0930   Coping/Psychosocial Interventions "   Supportive Measures active listening utilized;verbalization of feelings encouraged;self-care encouraged;relaxation techniques promoted       Goal: Develops/Participates in Therapeutic [place] to Support Successful Transition  Outcome: Ongoing (interventions implemented as appropriate)   09/13/18 1626   Overarching Goals (Adult)   Develops/Participates in Therapeutic [place] to Support Successful Transition making progress toward outcome     Intervention: Foster Therapeutic [place]   09/13/18 0930   Interventions   Trust Relationship/Rapport care explained;choices provided;thoughts/feelings acknowledged;reassurance provided;questions answered     Intervention: Mutually Develop Transition Plan   09/13/18 0930   Mutually Develop Transition Plan   Transition Support crisis management plan promoted         Problem: Suicidal Behavior (Adult)  Goal: Suicidal Behavior is Absent/Minimized/Managed  Outcome: Ongoing (interventions implemented as appropriate)   09/12/18 1023 09/13/18 1626   OTHER   Action Step/Short Term Goal (STG) Established 09/12/18 --    Suicidal Behavior is Absent/Minimized/Managed   Suicidal Behavior Managed/Minimized Time Frame for Action Step (STG) --  3 days   Suicidal Behavior Managed/Minimized Action Step (STG) Outcome --  making progress toward outcome       Problem: Skin Injury Risk (Adult)  Goal: Skin Health and Integrity  Outcome: Ongoing (interventions implemented as appropriate)   09/13/18 1626   Skin Injury Risk (Adult)   Skin Health and Integrity making progress toward outcome

## 2018-09-13 NOTE — PLAN OF CARE
Problem: Patient Care Overview  Goal: Plan of Care Review  Outcome: Outcome(s) achieved Date Met: 09/13/18 09/13/18 7324   OTHER   Outcome Summary Pt was evaluated by PT today she demonstrated independence with bed mobility and transfers bed to chair and vice versa. Pt reports she has not ambulated in greater than 20 years using her manual w/c propelling it independently. Pt does have a motorized chair at home. Pt's strength of ESTRELLITA JACOME's WFLs. She did have some ataxia in her trunk sitting on EOB with MMT but able to maintain independent balance. Pt was issued an HEP and she demonstrated independence with it and even commenting she had been given these exercises before from past PT sessions. Pt has PT goal of HEP and feel she does not need further PT at this time.    Coping/Psychosocial   Plan of Care Reviewed With patient   Coping/Psychosocial   Patient Agreement with Plan of Care agrees

## 2018-09-13 NOTE — THERAPY DISCHARGE NOTE
Acute Care - Physical Therapy Initial Eval/Discharge  [facility]     Patient Name: Lyly Zeng  : 1970  MRN: [id number]  Today's Date: 2018   Onset of Illness/Injury or Date of Surgery: 18  Date of Referral to PT: 18         Admit Date: 2018    Visit Dx:  No diagnosis found.  Patient Active Problem List   Diagnosis   • Carpal tunnel syndrome of right wrist   • Tendinitis of thumb   • Impingement syndrome of both shoulders   • Right shoulder pain   • Severe recurrent major depression without psychotic features (CMS/HCC)   • COPD (chronic obstructive pulmonary disease) (CMS/HCC)   • GERD (gastroesophageal reflux disease)   • Hereditary ataxia of Friedreich (CMS/HCC)     Past Medical History:   Diagnosis Date   • Arthritis    • Asthma    • Ataxia     DOES NOT HAVE GOOD BALANCE   • COPD (chronic obstructive pulmonary disease) (CMS/HCC)    • Depression    • GERD (gastroesophageal reflux disease)    • Learning disability    • Panic attacks      Past Surgical History:   Procedure Laterality Date   • BREAST SURGERY      CALCIUM NODULE REMOVED LEFT   • EYE MUSCLE SURGERY Right 2016    Procedure: RIGHT EYE MEDIAL RECTUS RECESSION;  Surgeon: John Kirkpatrick MD;  Location: [facility] [place] [facility] [place];  Service:    • TONSILLECTOMY     • TOOTH EXTRACTION            PT ASSESSMENT (last 12 hours)      Physical Therapy Evaluation     Row Name 18 1325          PT Evaluation Time/Intention    Subjective Information no complaints  -RICHIE     Document Type evaluation;discharge treatment  -RICHIE     Mode of Treatment physical therapy  -RICHIE     Patient Effort good  -RICHIE     Symptoms Noted During/After Treatment none  -RICHIE     Row Name 18 1325          General Information    Patient Profile Reviewed? yes  -RICHIE     Patient Observations alert;cooperative;agree to therapy  -RICHIE     Patient/Family Observations Pt had been attending class on unit but taken out for PT. Pt is well nourished female sitting in her w/c  accompanied by sitter.   -RICHIE     Prior Level of Function independent:  -RICHIE     Equipment Currently Used at Home wheelchair;wheelchair, motorized  -RICHIE     Existing Precautions/Restrictions fall  -RICHIE     Row Name 09/13/18 1325          Cognitive Assessment/Intervention- PT/OT    Orientation Status (Cognition) oriented x 4  -RICHIE     Follows Commands (Cognition) WFL  -RICHIE     Row Name 09/13/18 1325          Bed Mobility Assessment/Treatment    Bed Mobility Assessment/Treatment rolling left;rolling right;scooting/bridging;supine-sit-supine  -RICHIE     Rolling Left [place] (Bed Mobility) independent  -RICHIE     Rolling Right [place] (Bed Mobility) independent  -RICHIE     Scooting/Bridging [place] (Bed Mobility) independent  -RICHIE     Supine-Sit-Supine [place] (Bed Mobility) independent  -RICHIE     Row Name 09/13/18 1325          Transfer Assessment/Treatment    Transfer Assessment/Treatment bed-chair transfer  -RICHIE     Bed-Chair [place] (Transfers) conditional independence  -RICHIE     Chair-Bed [place] (Transfers) conditional independence  -RICHIE     Assistive Device (Bed-Chair Transfers) wheelchair  -RICHIE     Row Name 09/13/18 1325          Chair-Bed Transfer    Assistive Device (Chair-Bed Transfers) wheelchair  -     Row Name 09/13/18 1325          Gait/Stairs Assessment/Training    Comment (Gait/Stairs) Pt informed PT she has not ambulated in over 20 years after she had many falls and sustained some fractures it was safer to be in w/c per pt. She said she was never able to handle a walker. She is in her manual w/c at all times. Pt said she has a motorized w/c but does not use it much because it is hard to maneuver to clean her house or vacuum. She also likes having her feet on the floor of the manual w/c.   -RICHIE     Row Name 09/13/18 1325          Wheelchair Mobility/Management    Mobility Activities (Wheelchair) forward propulsion;backward propulsion;steering;turning;doorway navigation;stopping  -RICHIE     Forward  "Propulsion [place] (Wheelchair) conditional independence  -RICHIE     Backward Propulsion [place] (Wheelchair) conditional independence  -RICHIE     Steering [place] (Wheelchair) conditional independence  -RICHIE     Turning [place] (Wheelchair) conditional independence  -RICHIE     Doorway Navigation [place] (Wheelchair) conditional independence  -RICHIE     Stopping [place] (Wheelchair) conditional independence  -RICHIE     Distance Propelled in Feet (Wheelchair) 150'   -     Comment, Mobility Activities (Wheelchair) Pt used B LE's and some UE's to propel herself   -     Management Activities (Wheelchair) wheel locks/brakes management   locked brakes prior to transfer   -     Row Name 09/13/18 1325          General ROM    Head/Neck/Trunk --   some ataxia noted in trunk while on EOB during MMT  -     GENERAL ROM COMMENTS B LE's WFLs   -     Row Name 09/13/18 1325          MMT (Manual Muscle Testing)    General MMT Comments B LE's overall 4+/5 strength -- hip flexion, knee ext, knee flex and ankle DF   -     Row Name 09/13/18 1325          Pain Assessment    Additional Documentation Pain Scale: Numbers Pre/Post-Treatment (Group)  -     Row Name 09/13/18 1325          Pain Scale: Numbers Pre/Post-Treatment    Pain Scale: Numbers, Pretreatment 4/10  -     Pain Scale: Numbers, Post-Treatment 4/10  -RICHIE     Pre/Post Treatment Pain Comment Pt mentioned she has always has some back pain and some \"arthritis\" in her knees.   -     Pain Intervention(s) Repositioned   use pillow between thighs in sidelying for back   -     Row Name 09/13/18 1325          Plan of Care Review    Plan of Care Reviewed With patient  -     Row Name 09/13/18 1325          Physical Therapy Clinical Impression    Date of Referral to PT 09/13/18  -     Patient/Family Goals Statement (PT Clinical Impression) Pt c/o some stiffness in her knees and some back pain and would like some exercises to prevent stiffness. PT " mentioned that the motorized chair would help her knees but she prefers her manual w/c.   -RICHIE     Criteria for Skilled Interventions Met (PT Clinical Impression) no  -RICHIE     Pathology/Pathophysiology Noted (Describe Specifically for Each System) musculoskeletal  -RICHIE     Rehab Potential (PT Clinical Summary) other (see comments)   HEP issued today, no further PT   -RICHIE     Row Name 09/13/18 1324          Patient Education Goal (PT)    Activity (Patient Education Goal, PT) HEP   -RICHIE     [place]/Cues/Accuracy (Memory Goal 2, PT) demonstrates adequately;independent;verbalizes understanding  -RICHIE     Time Frame (Patient Education Goal, PT) 1 day;other (see comments)   HEP: supine LTR, IR/ER hooklying, sit ant/post pelvic tilt   -RICHIE     Progress/Outcome (Patient Education Goal, PT) goal met  -RICHIE     Row Name 09/13/18 1325          Positioning and Restraints    Pre-Treatment Position other (comment)   in w/c   -RICHIE     Post Treatment Position wheelchair  -RICHIE     In Wheelchair sitting;with other staff   sitter   -RICHIE     Row Name 09/13/18 1327          Physical Therapy Discharge Summary    Additional Documentation Discharge Summary, PT Eval (Group)  -RICHIE     Row Name 09/13/18 1325          Discharge Summary, PT Eval    Reason for Discharge (PT Discharge Summary) patient met all goals and outcomes;no further needs identified  -IRCHIE     Outcomes Achieved Upon Discharge (PT Discharge Summary) able to achieve all goals within established timeline  -RICHIE       User Key  (r) = Recorded By, (t) = Taken By, (c) = Cosigned By    Initials Name Provider Type    Lyndsay Suero, PT Physical Therapist          Physical Therapy Education     Title: PT OT SLP Therapies (Active)     Topic: Physical Therapy (Done)     Point: Home exercise program (Done)    Learning Progress Summary     Learner Status Readiness Method Response Comment Documented by    Patient Done MICKI Rollins,D,H RAFY ARORA  RICHIE 09/13/18 0878                      User Key      Initials Effective Dates Name Provider Type Discipline     06/08/18 -  Lyndsay Chester, PT Physical Therapist PT                PT Recommendation and Plan  Anticipated Discharge Disposition (PT):  (to be determined )  Therapy Frequency (PT Clinical Impression): evaluation only  Outcome Summary/Treatment Plan (PT)  Anticipated Discharge Disposition (PT):  (to be determined )  Reason for Discharge (PT Discharge Summary): patient met all goals and outcomes, no further needs identified  Plan of Care Reviewed With: patient  Outcome Summary: Pt was evaluated by PT today she demonstrated independence with bed mobility and transfers bed to chair and vice versa. Pt reports she has not ambulated in greater than 20 years using her manual w/c propelling it independently. Pt does have a motorized chair at home. Pt's strength of ESTRELLITA JACOME's WFLs. She did have some ataxia in her trunk sitting on EOB with MMT but able to maintain independent balance. Pt was issued an HEP and she demonstrated independence with it and even commenting she had been given these exercises before from past PT sessions. Pt has PT goal of HEP and feel she does not need further PT at this time.           Outcome Measures     Row Name 09/13/18 1325 09/13/18 1148          How much help from another person do you currently need...    Turning from your back to your side while in flat bed without using bedrails? 4  -RICHIE  --     Moving from lying on back to sitting on the side of a flat bed without bedrails? 4  -RICHIE  --     Moving to and from a bed to a chair (including a wheelchair)? 4  -RICHIE  --     Standing up from a chair using your arms (e.g., wheelchair, bedside chair)? 4  -RICHIE  --     Climbing 3-5 steps with a railing? 2  -RICHIE  --     To walk in hospital room? 2  -RICHIE  --     AM-PAC 6 Clicks Score 20  -RICHIE  --        How much help from another is currently needed...    Putting on and taking off regular lower body clothing?  -- 4  -KP     Bathing (including washing,  rinsing, and drying)  -- 4  -KP     Toileting (which includes using toilet bed pan or urinal)  -- 4  -KP     Putting on and taking off regular upper body clothing  -- 4  -KP     Taking care of personal grooming (such as brushing teeth)  -- 3  -KP     Eating meals  -- 4  -KP     Score  -- 23  -        Functional Assessment    Outcome Measure Options AM-PAC 6 Clicks Basic Mobility (PT)  -RICHIE AM-PAC 6 Clicks Daily Activity (OT)  -KP       User Key  (r) = Recorded By, (t) = Taken By, (c) = Cosigned By    Initials Name Provider Type     Christine Hood, OTR Occupational Therapist    Lyndsay Suero, PT Physical Therapist           Time Calculation:         PT Charges     Row Name 09/13/18 1747 09/13/18 1746          Time Calculation    Start Time 1615  -RICHIE 1325  -RICHIE     Stop Time 1623  -RICHIE 1345  -RICHIE     Time Calculation (min) 8 min  -RICHIE 20 min  -RICHIE     PT Received On  -- 09/13/18  -RICHIE        Time Calculation- PT    Total Timed Code Minutes- PT 8 minute(s)  -RICHIE 20 minute(s)  -RICHIE       User Key  (r) = Recorded By, (t) = Taken By, (c) = Cosigned By    Initials Name Provider Type    Lyndsay Suero, PT Physical Therapist        Therapy Suggested Charges     Code   Minutes Charges    None           Therapy Charges for Today     Code Description Service Date Service Provider Modifiers Qty    [id number]  PT EVAL MOD COMPLEXITY 2 9/13/2018 Lyndsay Chester, PT GP 1    [id number]  PT THER PROC EA 15 MIN 9/13/2018 Lyndsay Chester, PT GP 1          PT G-Codes  Outcome Measure Options: AM-PAC 6 Clicks Basic Mobility (PT)  AM-PAC 6 Clicks Score: 20  Score: 23    PT Discharge Summary  Anticipated Discharge Disposition (PT):  (to be determined )  Reason for Discharge: All goals achieved  Outcomes Achieved: Able to achieve all goals within established timeline  Discharge Destination: other (comment) (Pt still inpt on [facility]; d/c destination to be determined )    Lyndsay Chester, PT  9/13/2018

## 2018-09-13 NOTE — PLAN OF CARE
Problem: Patient Care Overview  Goal: Plan of Care Review  Outcome: Ongoing (interventions implemented as appropriate)   09/13/18 0215 09/13/18 0443   OTHER   Outcome Summary --  Anxiety rated 2/10, depression 6/10, and no SI/HI. Cooperative and med compliant. Will continue to monitor and assess.   Plan of Care Review   Progress --  improving   Coping/Psychosocial   Plan of Care Reviewed With patient --    Coping/Psychosocial   Patient Agreement with Plan of Care agrees --        Problem: Overarching Goals (Adult)  Goal: Adheres to Safety Considerations for Self and Others  Outcome: Ongoing (interventions implemented as appropriate)    Goal: Optimized Coping Skills in Response to Life Stressors  Outcome: Ongoing (interventions implemented as appropriate)    Goal: Develops/Participates in Therapeutic [place] to Support Successful Transition  Outcome: Ongoing (interventions implemented as appropriate)      Problem: Skin Injury Risk (Adult)  Goal: Skin Health and Integrity  Outcome: Ongoing (interventions implemented as appropriate)

## 2018-09-13 NOTE — THERAPY EVALUATION
Acute Care - Occupational Therapy Initial Evaluation  [facility]     Patient Name: Lyly Zeng  : 1970  MRN: [id number]  Today's Date: 2018  Onset of Illness/Injury or Date of Surgery: 18  Date of Referral to OT: 18       Admit Date: 2018     No diagnosis found.  Patient Active Problem List   Diagnosis   • Carpal tunnel syndrome of right wrist   • Tendinitis of thumb   • Impingement syndrome of both shoulders   • Right shoulder pain   • Severe recurrent major depression without psychotic features (CMS/HCC)   • COPD (chronic obstructive pulmonary disease) (CMS/HCC)   • GERD (gastroesophageal reflux disease)   • Hereditary ataxia of Friedreich (CMS/[facility])     Past Medical History:   Diagnosis Date   • Arthritis    • Asthma    • Ataxia     DOES NOT HAVE GOOD BALANCE   • COPD (chronic obstructive pulmonary disease) (CMS/[facility])    • Depression    • GERD (gastroesophageal reflux disease)    • Learning disability    • Panic attacks      Past Surgical History:   Procedure Laterality Date   • BREAST SURGERY      CALCIUM NODULE REMOVED LEFT   • EYE MUSCLE SURGERY Right 2016    Procedure: RIGHT EYE MEDIAL RECTUS RECESSION;  Surgeon: John Kirkpatrick MD;  Location:  [facility];  Service:    • TONSILLECTOMY     • TOOTH EXTRACTION            OT ASSESSMENT FLOWSHEET (last 72 hours)      Occupational Therapy Evaluation     Row Name 18 1130                   OT Evaluation Time/Intention    Subjective Information no complaints  -        Document Type evaluation;therapy note (daily note)  -        Mode of Treatment individual therapy;occupational therapy  -        Patient Effort good  -           General Information    Patient Profile Reviewed? yes  -        Onset of Illness/Injury or Date of Surgery 18  -        Patient Observations alert;cooperative;agree to therapy  -        Patient/Family Observations pt sitting in wc in BR  -        Prior Level of Function  independent:;ADL's;w/c or scooter;transfer  -        Equipment Currently Used at Home shower chair;wheelchair  -        Existing Precautions/Restrictions --   pt has decr FMC in L hand, pt states burns herself at home  -KP        Limitations/Impairments sensory   L hand, carpal tunnel, L sh rotator cuff inj, L tennis elbow  -        Benefits Reviewed patient:;improve function;increase strength;increase balance;increase independence  -           Cognitive Assessment/Intervention- PT/OT    Orientation Status (Cognition) oriented x 4  -KP        Follows Commands (Cognition) WFL  -           Bed Mobility Assessment/Treatment    Comment (Bed Mobility) up in   -           Transfer Assessment/Treatment    Transfer Assessment/Treatment chair-bed transfer;sit-stand transfer;stand-sit transfer;stand pivot/stand step transfer  -           Chair-Bed Transfer    Chair-Bed [place] (Transfers) conditional independence  -        Assistive Device (Chair-Bed Transfers) wheelchair  -           Sit-Stand Transfer    Sit-Stand [place] (Transfers) conditional independence  -        Assistive Device (Sit-Stand Transfers) wheelchair  -           Stand-Sit Transfer    Stand-Sit [place] (Transfers) conditional independence  -        Assistive Device (Stand-Sit Transfers) wheelchair  -           Stand Pivot/Stand Step Transfer    Stand Pivot/Stand Step [place] conditional independence  -        Assistive Device (Stand Pivot Stand Step Transfer) wheelchair  -           ADL Assessment/Intervention    BADL Assessment/Intervention bathing;upper body dressing;lower body dressing;grooming;toileting;feeding  -           Bathing Assessment/Intervention    Comment (Bathing) pt took a shower yesterday and day before w. set up.  -           Upper Body Dressing Assessment/Training    Comment (Upper Body Dressing) no A, but pt states she cannot do buttons or snaps really. wears clothing without  buttons, snaps  -           Lower Body Dressing Assessment/Training    Comment (Lower Body Dressing) wears slip on shoes and slippers  -           Grooming Assessment/Training    Tow Level (Grooming) grooming skills;conditional independence  -        Grooming Position sink side;supported standing;supported sitting  -           Self-Feeding Assessment/Training    Comment (Feeding) pt reports she has trouble feeding herself at home due to utensil. OT ed on use of built up utensils  -           General ROM    GENERAL ROM COMMENTS B UE 8/8  -           MMT (Manual Muscle Testing)    General MMT Comments R UE 3+/5 L UE 4/5  -KP           Motor Assessment/Interventions    Additional Documentation Balance (Group);Balance Interventions (Group);Fine Motor Testing & Training (Group);Functional Endurance Training (Group);Therapeutic Exercise (Group);Therapeutic Exercise Interventions (Group)  -           Therapeutic Exercise    Upper Extremity (Therapeutic Exercise) bicep curl, bilateral;tricep extension, bilateral  -        Upper Extremity Range of Motion (Therapeutic Exercise) shoulder flexion/extension, bilateral;elbow flexion/extension, bilateral;wrist flexion/extension, left;forearm supination/pronation, bilateral  -        Hand (Therapeutic Exercise) hand , right  -        Exercise Type (Therapeutic Exercise) AROM (active range of motion)  -        Position (Therapeutic Exercise) seated  -        Sets/Reps (Therapeutic Exercise) 10x1  -           Balance    Balance static sitting balance;static standing balance  -           Static Sitting Balance    Level of Tow (Unsupported Sitting, Static Balance) independent  -        Sitting Position (Unsupported Sitting, Static Balance) sitting in wheelchair  -        Time Able to Maintain Position (Unsupported Sitting, Static Balance) more than 5 minutes  -           Static Standing Balance    Level of Tow (Supported  Standing, Static Balance) conditional independence  -        Time Able to Maintain Position (Supported Standing, Static Balance) --   for tsf  -KP        Assistive Device Utilized (Supported Standing, Static Balance) other (see comments)   wc and bed rail  -           Fine Motor Testing & Training    Comment, Fine Motor Coordination FMC opposition impaired on R hand w. 5th digit and thumb. R hand intact  -KP           Functional Endurance Training    Comment, Functional Endurance good  -           Positioning and Restraints    Pre-Treatment Position sitting in chair/recliner  -KP        Post Treatment Position wheelchair  -KP        In Wheelchair sitting;with other staff   prabhakar  -           Plan of Care Review    Plan of Care Reviewed With patient  -           Clinical Impression (OT)    Date of Referral to OT 09/13/18  -        Criteria for Skilled Therapeutic Interventions Met (OT Eval) yes;treatment indicated   for FMC, and IADLs for rehab placement  -        Rehab Potential (OT Eval) good, to achieve stated therapy goals  -        Therapy Frequency (OT Eval) 5 times/wk  -        Anticipated Discharge Disposition (OT) skilled nursing facility;home with home health   pending pt having place to transition for home  -        Patient/Family Concerns, Anticipated Discharge Disposition (OT) worries she will burn herself at home. will be living alone. moving from [facility]. no one here to A her. states she may hire caregivers once she finds a place but hopes to go to rehab first  -           OT Goals    Strength Goal Selection (OT) strength, OT goal 1  -        Coordination Goal Selection (OT) coordination, OT goal 1  -KP        Additional Documentation Strength Goal Selection (OT) (Row);Coordination Goal Selection (OT) (Row)  -           Strength Goal 1 (OT)    Strength Goal 1 (OT) increase to 4/5 R UE and 4+/5 L UE to increase self-care skills, IADLS  -KP        Time Frame (Strength  Goal 1, OT) long term goal (LTG);by discharge  -KP        Progress/Outcome (Strength Goal 1, OT) goal ongoing  -KP           Coordination Goal 1 (OT)    Activity/Assistive Device (Coordination Goal 1, OT) FM written ex program;FM task   to be able to complete FMC tasks to inc IADLs   -KP        [place] Level/Cues Needed (Coordination Goal 1, OT) set-up required  -KP        Time Frame (Coordination Goal 1, OT) long term goal (LTG);by discharge  -KP        Progress/Outcomes (Coordination Goal 1, OT) goal ongoing  -KP          User Key  (r) = Recorded By, (t) = Taken By, (c) = Cosigned By    Initials Name Effective Dates     Sana Christine Reinoso, OTR 06/08/18 -            Occupational Therapy Education     Title: PT OT SLP Therapies (Active)     Topic: Occupational Therapy (Active)     Point: ADL training (Resolved)     Description: Instruct learner(s) on proper safety adaptation and remediation techniques during self care or transfers.   Instruct in proper use of assistive devices.   Learning Progress Summary     Learner Status Readiness Method Response Comment Documented by    Patient Done Acceptance E,MICKI,RAFY FREED ed pt on role of OT, benefit of therapy, POC w. OT. ed on safety w. ADLs. pt states owns a shower seat w. back. ed on use of tub bench as she has bathtub for safety reasons. pt verbally understands.  09/13/18 1146          Point: Precautions (Done)     Description: Instruct learner(s) on prescribed precautions during self-care and functional transfers.   Learning Progress Summary     Learner Status Readiness Method Response Comment Documented by    Patient Done Acceptance E,MICKI,RAFY FREED ed pt on role of OT, benefit of therapy, POC w. OT. ed on safety w. ADLs. pt states owns a shower seat w. back. ed on use of tub bench as she has bathtub for safety reasons. pt verbally understands.  09/13/18 1146          Point: Body mechanics (Resolved)     Description: Instruct learner(s) on proper positioning  and spine alignment during self-care, functional mobility activities and/or exercises.   Learning Progress Summary     Learner Status Readiness Method Response Comment Documented by    Patient Done Acceptance E,TB,D RAFY ARORA ed pt on role of OT, benefit of therapy, POC w. OT. ed on safety w. ADLs. pt states owns a shower seat w. back. ed on use of tub bench as she has bathtub for safety reasons. pt verbally understands.  09/13/18 1146                      User Key     Initials Effective Dates Name Provider Type Discipline     06/08/18 -  Christine Hood, OTR Occupational Therapist OT                  OT Recommendation and Plan  Outcome Summary/Treatment Plan (OT)  Anticipated Discharge Disposition (OT): skilled nursing facility, home with home health (pending pt having place to transition for home)  Patient/Family Concerns, Anticipated Discharge Disposition (OT): worries she will burn herself at home. will be living alone. moving from [facility]. no one here to A her. states she may hire caregivers once she finds a place but hopes to go to rehab first  Therapy Frequency (OT Eval): 5 times/wk  Plan of Care Review  Plan of Care Reviewed With: patient  Plan of Care Reviewed With: patient  Outcome Summary: pt evaluated for OT. pt requires no A w. ADLs and tsf. But has decr FMC R hand, decreased strength in R and L UE. pt wanting rehab placement to work on IADLs and FMC w. R hand. pt feels unsafe at home due to burning herself often and needs a tub bench. OT cont to see pt for FMC, strength while in hospital, and IADLs (on rehab).           Outcome Measures     Row Name 09/13/18 1142             How much help from another is currently needed...    Putting on and taking off regular lower body clothing? 4  -KP      Bathing (including washing, rinsing, and drying) 4  -KP      Toileting (which includes using toilet bed pan or urinal) 4  -KP      Putting on and taking off regular upper body clothing 4  -KP       Taking care of personal grooming (such as brushing teeth) 3  -      Eating meals 4  -      Score 23  -         Functional Assessment    Outcome Measure Options AM-PAC 6 Clicks Daily Activity (OT)  -        User Key  (r) = Recorded By, (t) = Taken By, (c) = Cosigned By    Initials Name Provider Type    Christine Hauser OTR Occupational Therapist          Time Calculation:         Time Calculation- OT     Row Name 09/13/18 1150             Time Calculation- OT    OT Start Time 1100  -      OT Stop Time 1125  -      OT Time Calculation (min) 25 min  -      Total Timed Code Minutes- OT 15 minute(s)  -      OT Received On 09/13/18  -      OT - Next Appointment 09/14/18  -      OT Goal Re-Cert Due Date 09/20/18  -        User Key  (r) = Recorded By, (t) = Taken By, (c) = Cosigned By    Initials Name Provider Type    Christine Hauser OTR Occupational Therapist        Therapy Suggested Charges     Code   Minutes Charges    None           Therapy Charges for Today     Code Description Service Date Service Provider Modifiers Qty    [id number]  OT SELF CARE/MGMT/TRAIN EA 15 MIN 9/13/2018 Christine Hood OTR GO 1    [id number]  OT EVAL LOW COMPLEXITY 2 9/13/2018 Christine Hood OTR GO 1               GILDA Degroot  9/13/2018

## 2018-09-13 NOTE — PLAN OF CARE
Problem: Patient Care Overview  Goal: Plan of Care Review   09/13/18 1147   OTHER   Outcome Summary pt evaluated for OT. pt requires no A w. ADLs and tsf. But has decr FMC R hand, decreased strength in R and L UE. pt wanting rehab placement to work on IADLs and FMC w. R hand. pt feels unsafe at home due to burning herself often and needs a tub bench. OT cont to see pt for FMC, strength while in hospital, and IADLs (on rehab).    Coping/Psychosocial   Plan of Care Reviewed With patient

## 2018-09-14 PROCEDURE — 97112 NEUROMUSCULAR REEDUCATION: CPT | Performed by: OCCUPATIONAL THERAPIST

## 2018-09-14 RX ORDER — CETIRIZINE HYDROCHLORIDE 10 MG/1
5 TABLET ORAL DAILY
Status: DISCONTINUED | OUTPATIENT
Start: 2018-09-14 | End: 2018-09-17 | Stop reason: HOSPADM

## 2018-09-14 RX ADMIN — GABAPENTIN 300 MG: 300 CAPSULE ORAL at 20:47

## 2018-09-14 RX ADMIN — CLONAZEPAM 0.5 MG: 0.5 TABLET ORAL at 08:53

## 2018-09-14 RX ADMIN — CETIRIZINE HYDROCHLORIDE 5 MG: 10 TABLET, FILM COATED ORAL at 12:19

## 2018-09-14 RX ADMIN — MELOXICAM 15 MG: 15 TABLET ORAL at 08:53

## 2018-09-14 RX ADMIN — MIRTAZAPINE 15 MG: 15 TABLET, FILM COATED ORAL at 20:47

## 2018-09-14 RX ADMIN — CLONAZEPAM 0.5 MG: 0.5 TABLET ORAL at 20:47

## 2018-09-14 RX ADMIN — CLONAZEPAM 0.5 MG: 0.5 TABLET ORAL at 17:11

## 2018-09-14 NOTE — PROGRESS NOTES
Continued Stay Note  [facility]     Patient Name: Lyly Zeng  MRN: [id number]  Today's Date: 9/14/2018    Admit Date: 9/7/2018          Discharge Plan     Row Name 09/14/18 1109       Plan    Plan Comments SW met with pt to review discharge planning. Pt appeared to be somewhat indecisive about discharge plan. She is desiring to either live with her sister, Modesta, or friends to prevent returning home with her boyfriend. She also stated that she desires to go to short-term rehab. Following the conversation, pt made a decision to not go to short-term rehab and possibly live with a friend or her sister. ASHLEY spoke with the pt's sister, Modesta, who stated that the pt living with them on a temporary basis is not an option at this time. ASHLEY spoke with ANIL Goins on rehab ([id number]) and due to pt being independent with transfers, she was discharged from receiving PT on CMU. However, pt will continue with OT to assist with IADL's. ASHLEY spoke with a representative at [facility] and they currently have a waiting list and the price is out of the pt's range for affordability. ASHLEY will communicate all of this information to pt in order to determine a final plan. ASHLEY will explore home health as an option for the pt following discharge to resume ongoing OT until she can move into the [facility] in [place].               Discharge Codes    No documentation.           Amanda Villavicencio LCSW

## 2018-09-14 NOTE — PROGRESS NOTES
"The patient had expressed interest in placement in a rehabilitation facility but now has decided against this and is not cooperative with physical therapy.  Thus, no such referral community.  Instead, the patient reports that she hopes to go and stay with her sister following discharge.  She is complaining of \"ringing in my ears\" which she attributes to seasonal allergies.  I will make certain that the patient is on an anti-allergy agent.  He is not endorsing suicidal ideation at this time.  "

## 2018-09-14 NOTE — PLAN OF CARE
Problem: Patient Care Overview  Goal: Plan of Care Review  Outcome: Ongoing (interventions implemented as appropriate)   09/14/18 0915 09/14/18 1427   OTHER   Outcome Summary --  Pt has been cooperative with care and compliant with medications. She denies SI, HI, depression and hallucinations. She reports some anxiety concerning where she might go from here when she is discharged. She has attended groups this shift. Will continue to monitor for safety and any change in condition.   Plan of Care Review   Progress --  no change   Coping/Psychosocial   Plan of Care Reviewed With patient --    Coping/Psychosocial   Patient Agreement with Plan of Care agrees --        Problem: Overarching Goals (Adult)  Goal: Adheres to Safety Considerations for Self and Others  Outcome: Ongoing (interventions implemented as appropriate)    Intervention: Develop and Maintain Individualized Safety Plan   09/14/18 0915 09/14/18 1400   C-SSRS (Recent)   Wish to be Dead no --    Suicidal Thoughts no --    Suicidal Thought with Method No Plan/Intent no --    Suicidal Intent (without Specific Plan) no --    Suicide Intent with Specific Plan no --    Suicide Behavior no --    Violence Risk   Feels Like Hurting Others no --    Previous Attempt to Harm Others no --    Develop and Maintain Individualized Safety Plan   Safety Measures --  safety rounds completed       Goal: Optimized Coping Skills in Response to Life Stressors  Outcome: Ongoing (interventions implemented as appropriate)   09/14/18 1427   Overarching Goals (Adult)   Optimized Coping Skills in Response to Life Stressors making progress toward outcome     Intervention: Promote Effective Coping Strategies   09/14/18 0915   Coping/Psychosocial Interventions   Supportive Measures active listening utilized;self-care encouraged       Goal: Develops/Participates in Therapeutic [place] to Support Successful Transition  Outcome: Ongoing (interventions implemented as appropriate)   09/14/18  1427   Overarching Goals (Adult)   Develops/Participates in Therapeutic [place] to Support Successful Transition making progress toward outcome     Intervention: Foster Therapeutic [place]   09/14/18 0915   Interventions   Trust Relationship/Rapport care explained;choices provided;thoughts/feelings acknowledged;reassurance provided;questions encouraged;empathic listening provided     Intervention: Mutually Develop Transition Plan   09/14/18 0915   Mutually Develop Transition Plan   Transition Support crisis management plan promoted         Problem: Suicidal Behavior (Adult)  Goal: Suicidal Behavior is Absent/Minimized/Managed  Outcome: Ongoing (interventions implemented as appropriate)   09/12/18 1023 09/14/18 1427   OTHER   Action Step/Short Term Goal (STG) Established 09/12/18 --    Suicidal Behavior is Absent/Minimized/Managed   Suicidal Behavior Managed/Minimized Time Frame for Action Step (STG) --  2 days   Suicidal Behavior Managed/Minimized Action Step (STG) Outcome --  making progress toward outcome       Problem: Skin Injury Risk (Adult)  Goal: Skin Health and Integrity  Outcome: Ongoing (interventions implemented as appropriate)   09/14/18 1427   Skin Injury Risk (Adult)   Skin Health and Integrity making progress toward outcome

## 2018-09-14 NOTE — PROGRESS NOTES
Continued Stay Note  [facility]     Patient Name: Lyly Zeng  MRN: [id number]  Today's Date: 9/14/2018    Admit Date: 9/7/2018          Discharge Plan     Row Name 09/14/18 9760       Plan    Plan Comments ASHLEY met with pt to review discharge planning. SW explained that she is unable to move in with her sister, Modesta, and she does not meet criteria for short-term rehab as she is independent with transfers, per rehab physical therapist. Pt understood and stated that she would return home with her boyfriend with home health until she can move into the [facility]. SW inquired about any risk of violence in the home and she denied any violence or abuse, but is worried that her boyfriend doesn't understand her mental health issues and she is afraid of being triggered to engage in marijuana use at home. However, she stated that she will continue to meet with her individual therapy to identify how to overcome triggers until she moves away and will utilize resources through [facility] home health treatment team. SW briefly explained the purpose of APS in the case that she ever believes that her safety is at risk in the home and she agreed with this plan. ASHLEY contacted Toyin Beltran, [facility] home health liaison, regarding accepting the pt at discharge. SW will contact [facility] home health at time of pt's discharge for coordination of care.     Row Name 09/14/18 8534       Plan    Plan Comments ASHLEY met with pt to review discharge planning. Pt appeared to be somewhat indecisive about discharge plan. She is desiring to either live with her sister, Modesta, or friends to prevent returning home with her boyfriend. She also stated that she desires to go to short-term rehab. Following the conversation, pt made a decision to not go to short-term rehab and possibly live with a friend or her sister. ASHLEY spoke with the pt's sister, Modesta, who stated that the pt living with them on a temporary basis is not an option at this time. ASHLEY spoke with Briseida  PT on rehab ([id number]) and due to pt being independent with transfers, she was discharged from receiving PT on CMU. However, pt will continue with OT to assist with IADL's. ASHLEY also contacted [place] at [facility] and they currently have a waiting list. ASHLEY will communicate all of this information to pt in order to determine a final plan. SW will explore home health as an option for the pt following discharge until she can move into the [facility] in [place].               Discharge Codes    No documentation.           Amanda Villavicencio LCSW

## 2018-09-14 NOTE — THERAPY TREATMENT NOTE
Acute Care - Occupational Therapy Treatment Note  [facility]     Patient Name: Lyly Zeng  : 1970  MRN: [id number]  Today's Date: 2018  Onset of Illness/Injury or Date of Surgery: 18  Date of Referral to OT: 18       Admit Date: 2018     No diagnosis found.  Patient Active Problem List   Diagnosis   • Carpal tunnel syndrome of right wrist   • Tendinitis of thumb   • Impingement syndrome of both shoulders   • Right shoulder pain   • Severe recurrent major depression without psychotic features (CMS/HCC)   • COPD (chronic obstructive pulmonary disease) (CMS/HCC)   • GERD (gastroesophageal reflux disease)   • Hereditary ataxia of Friedreich (CMS/HCC)     Past Medical History:   Diagnosis Date   • Arthritis    • Asthma    • Ataxia     DOES NOT HAVE GOOD BALANCE   • COPD (chronic obstructive pulmonary disease) (CMS/HCC)    • Depression    • GERD (gastroesophageal reflux disease)    • Learning disability    • Panic attacks      Past Surgical History:   Procedure Laterality Date   • BREAST SURGERY      CALCIUM NODULE REMOVED LEFT   • EYE MUSCLE SURGERY Right 2016    Procedure: RIGHT EYE MEDIAL RECTUS RECESSION;  Surgeon: John Kirkpatrick MD;  [place] [facility] [place] [facility] [place]  Service:    • TONSILLECTOMY     • TOOTH EXTRACTION         Therapy Treatment          Rehabilitation Treatment Summary     Row Name 18 1517             Treatment Time/Intention    Discipline occupational therapist  -SO      Document Type therapy note (daily note)  -SO      Subjective Information no complaints  -SO      Mode of Treatment individual therapy;occupational therapy  -SO      Patient/Family Observations Pt sitting up in w/c  -SO      Patient Effort good  -SO      Recorded by [SO] Kaitlyn Edward, OTR 18 1521      Row Name 18 1517             Cognitive Assessment/Intervention- PT/OT    Orientation Status (Cognition) oriented x 4  -SO      Follows Commands (Cognition) WFL  -SO      Recorded  by [SO] Modesta Edwarddixon Johnson, OTR 09/14/18 1521      Row Name 09/14/18 1517             Fine Motor Testing & Training    Comment, Fine Motor Coordination Pt performs multiple FMC tasks with bilat hands. Pt shows fair- coordination with in hand manipulation and 2pt pinch. Given multiple HEP and FMC tasks to work on in spare time. Given green foam block for hand strengthing.  -SO      Recorded by [SO] Cheyanne Kaitlynrichard Johnson, OTR 09/14/18 1521      Row Name 09/14/18 1517             Positioning and Restraints    Pre-Treatment Position sitting in chair/recliner  -SO      Post Treatment Position wheelchair  -SO      In Wheelchair sitting;with nsg  -SO      Recorded by [SO] Kaitlyn Edward, OTR 09/14/18 1521      Row Name 09/14/18 1517             Pain Scale: Numbers Pre/Post-Treatment    Pain Scale: Numbers, Pretreatment 0/10 - no pain  -SO      Recorded by [SO] Kaitlyn Edward, OTR 09/14/18 1521        User Key  (r) = Recorded By, (t) = Taken By, (c) = Cosigned By    Initials Name Effective Dates Discipline    SO Modesta Edwarddixon Johnson, OTR 04/13/15 -  OT               Occupational Therapy Education     Title: PT OT SLP Therapies (Done)     Topic: Occupational Therapy (Done)     Point: ADL training (Resolved)     Description: Instruct learner(s) on proper safety adaptation and remediation techniques during self care or transfers.   Instruct in proper use of assistive devices.   Learning Progress Summary     Learner Status Readiness Method Response Comment Documented by    Patient Done Acceptance E,TB,D MAITE,RAFY ed pt on role of OT, benefit of therapy, POC w. OT. ed on safety w. ADLs. pt states owns a shower seat w. back. ed on use of tub bench as she has bathtub for safety reasons. pt verbally understands.  09/13/18 1146          Point: Home exercise program (Done)     Description: Instruct learner(s) on appropriate technique for monitoring, assisting and/or progressing therapeutic  exercises/activities.   Learning Progress Summary     Learner Status Readiness Method Response Comment Documented by    Patient Done Acceptance E,TB VU FM tasks, green foam block SO 09/14/18 1521          Point: Precautions (Done)     Description: Instruct learner(s) on prescribed precautions during self-care and functional transfers.   Learning Progress Summary     Learner Status Readiness Method Response Comment Documented by    Patient Done Acceptance E,TB,ALEXANDER ARORA,DU ed pt on role of OT, benefit of therapy, POC w. OT. ed on safety w. ADLs. pt states owns a shower seat w. back. ed on use of tub bench as she has bathtub for safety reasons. pt verbally understands.  09/13/18 1146          Point: Body mechanics (Resolved)     Description: Instruct learner(s) on proper positioning and spine alignment during self-care, functional mobility activities and/or exercises.   Learning Progress Summary     Learner Status Readiness Method Response Comment Documented by    Patient Done Acceptance E,TB,ALEXANDER VU,DU ed pt on role of OT, benefit of therapy, POC w. OT. ed on safety w. ADLs. pt states owns a shower seat w. back. ed on use of tub bench as she has bathtub for safety reasons. pt verbally understands.  09/13/18 1146                      User Key     Initials Effective Dates Name Provider Type Discipline    SO 04/13/15 -  Kaitlyn Edward, OTR Occupational Therapist OT     06/08/18 -  Christine Hood, OTR Occupational Therapist OT                OT Recommendation and Plan              Outcome Measures     Row Name 09/14/18 1500 09/13/18 1325 09/13/18 1148       How much help from another person do you currently need...    Turning from your back to your side while in flat bed without using bedrails?  -- 4  -RICHIE  --    Moving from lying on back to sitting on the side of a flat bed without bedrails?  -- 4  -RICHIE  --    Moving to and from a bed to a chair (including a wheelchair)?  -- 4  -RICHIE  --    Standing up from  a chair using your arms (e.g., wheelchair, bedside chair)?  -- 4  -RICHIE  --    Climbing 3-5 steps with a railing?  -- 2  -RICHIE  --    To walk in hospital room?  -- 2  -RICHIE  --    AM-PAC 6 Clicks Score  -- 20  -RICHIE  --       How much help from another is currently needed...    Putting on and taking off regular lower body clothing? 4  -SO  -- 4  -KP    Bathing (including washing, rinsing, and drying) 4  -SO  -- 4  -KP    Toileting (which includes using toilet bed pan or urinal) 4  -SO  -- 4  -KP    Putting on and taking off regular upper body clothing 4  -SO  -- 4  -KP    Taking care of personal grooming (such as brushing teeth) 3  -SO  -- 3  -KP    Eating meals 4  -SO  -- 4  -KP    Score 23  -SO  -- 23  -KP       Functional Assessment    Outcome Measure Options AM-PAC 6 Clicks Daily Activity (OT)  -SO AM-PAC 6 Clicks Basic Mobility (PT)  -RICHIE AM-PAC 6 Clicks Daily Activity (OT)  -KP      User Key  (r) = Recorded By, (t) = Taken By, (c) = Cosigned By    Initials Name Provider Type    Kaitlyn Fuller OTR Occupational Therapist    Christine Hauser OTR Occupational Therapist    Lyndsay Suero, PT Physical Therapist           Time Calculation:         Time Calculation- OT     Row Name 09/14/18 1522             Time Calculation- OT    OT Start Time 0950  -SO      OT Stop Time 1005  -SO      OT Time Calculation (min) 15 min  -SO      Total Timed Code Minutes- OT 15 minute(s)  -SO      OT Received On 09/14/18  -SO        User Key  (r) = Recorded By, (t) = Taken By, (c) = Cosigned By    Initials Name Provider Type    Kaitlyn Fuller OTR Occupational Therapist           Therapy Suggested Charges     Code   Minutes Charges    None           Therapy Charges for Today     Code Description Service Date Service Provider Modifiers Qty    [id number]  OT NEUROMUSC RE EDUCATION EA 15 MIN 9/14/2018 Kaitlyn Edward OTR GO 1               GILDA Urena  9/14/2018

## 2018-09-15 RX ADMIN — CETIRIZINE HYDROCHLORIDE 5 MG: 10 TABLET, FILM COATED ORAL at 08:46

## 2018-09-15 RX ADMIN — MIRTAZAPINE 15 MG: 15 TABLET, FILM COATED ORAL at 21:12

## 2018-09-15 RX ADMIN — MELOXICAM 15 MG: 15 TABLET ORAL at 08:46

## 2018-09-15 RX ADMIN — CLONAZEPAM 0.5 MG: 0.5 TABLET ORAL at 21:12

## 2018-09-15 RX ADMIN — CLONAZEPAM 0.5 MG: 0.5 TABLET ORAL at 08:46

## 2018-09-15 RX ADMIN — CLONAZEPAM 0.5 MG: 0.5 TABLET ORAL at 15:32

## 2018-09-15 RX ADMIN — GABAPENTIN 300 MG: 300 CAPSULE ORAL at 21:12

## 2018-09-15 NOTE — PLAN OF CARE
Problem: Patient Care Overview  Goal: Plan of Care Review  Outcome: Ongoing (interventions implemented as appropriate)   09/15/18 1550   OTHER   Outcome Summary The patient rated her depression at a 5/10. She denied any anxiety, SI, HI, or hallucinations. The patient complained of some back pain but reported relief from scheduled Mobic. The patient has voiced she feels she is ready to go home. Pleasent and active in the [facility]. Cooperative with medications. Attending groups. Continuing to monitor.    Plan of Care Review   Progress improving   Coping/Psychosocial   Plan of Care Reviewed With patient   Coping/Psychosocial   Patient Agreement with Plan of Care agrees     Goal: Individualization and Mutuality  Outcome: Ongoing (interventions implemented as appropriate)    Goal: Discharge Needs Assessment  Outcome: Ongoing (interventions implemented as appropriate)    Goal: Interprofessional Rounds/Family Conf  Outcome: Ongoing (interventions implemented as appropriate)      Problem: Overarching Goals (Adult)  Goal: Adheres to Safety Considerations for Self and Others  Outcome: Ongoing (interventions implemented as appropriate)   09/15/18 1550   Overarching Goals (Adult)   Adheres to Safety Considerations for Self and Others making progress toward outcome     Intervention: Develop and Maintain Individualized Safety Plan   09/15/18 0000 09/15/18 1030 09/15/18 1315   Violence Risk   Feels Like Hurting Others --  no --    Previous Attempt to Harm Others no --  --    C-SSRS (Recent)   Wish to be Dead --  no --    Suicidal Thoughts --  no --    Suicide Behavior --  no --    Develop and Maintain Individualized Safety Plan   Safety Measures --  --  safety plan reviewed;suicide assessment completed       Goal: Optimized Coping Skills in Response to Life Stressors  Outcome: Ongoing (interventions implemented as appropriate)   09/15/18 1550   Overarching Goals (Adult)   Optimized Coping Skills in Response to Life Stressors making  progress toward outcome     Intervention: Promote Effective Coping Strategies   09/15/18 1030   Coping/Psychosocial Interventions   Supportive Measures active listening utilized;relaxation techniques promoted;self-care encouraged;self-reflection promoted;self-responsibility promoted;verbalization of feelings encouraged       Goal: Develops/Participates in Therapeutic [place] to Support Successful Transition  Outcome: Ongoing (interventions implemented as appropriate)   09/15/18 1550   Overarching Goals (Adult)   Develops/Participates in Therapeutic [place] to Support Successful Transition making progress toward outcome     Intervention: Foster Therapeutic [place]   09/15/18 1030   Interventions   Trust Relationship/Rapport care explained;choices provided;emotional support provided;empathic listening provided;questions answered;questions encouraged;reassurance provided;thoughts/feelings acknowledged         Problem: Suicidal Behavior (Adult)  Goal: Suicidal Behavior is Absent/Minimized/Managed  Outcome: Ongoing (interventions implemented as appropriate)   09/15/18 1550   Suicidal Behavior is Absent/Minimized/Managed   Suicidal Behavior Managed/Minimized Time Frame for Action Step (STG) 1 day   Suicidal Behavior Managed/Minimized Action Step (STG) Outcome making progress toward outcome       Problem: Skin Injury Risk (Adult)  Goal: Skin Health and Integrity  Outcome: Ongoing (interventions implemented as appropriate)   09/15/18 1550   Skin Injury Risk (Adult)   Skin Health and Integrity making progress toward outcome

## 2018-09-15 NOTE — PROGRESS NOTES
The patient continues to complain of dysphoric mood.  She reports she plans to return to [place] upon her discharge from the hospital.  When seen today the patient states that she wishes to remain in the hospital through the weekend and we will plan on early week discharge.

## 2018-09-15 NOTE — PLAN OF CARE
"Problem: Patient Care Overview  Goal: Plan of Care Review  Outcome: Ongoing (interventions implemented as appropriate)   09/15/18 0000 09/15/18 0300   OTHER   Outcome Summary --  Pt rated anxiety 4, depression 9, denied thoughts to hurt self or others. Sitter remains at bedside for safety. Cooperaitve and compliant with medications. Pt has been present out in [facility] [place] and does engage with others. Pt now knows she does not want to stay with \"controlling\" boyfriend and has plans to get into a facitlity (currently on waiting list). Pt was upset to the fact that her sisiter will not allow her to stay with her until she can get into facility. Pt statedf she has plans to ask boyfriend who owns two homes if she can stay at one and him the other until she is accepted. Will continue to monitor.   Plan of Care Review   Progress --  improving   Coping/Psychosocial   Plan of Care Reviewed With patient --    Coping/Psychosocial   Patient Agreement with Plan of Care agrees --        Problem: Overarching Goals (Adult)  Goal: Adheres to Safety Considerations for Self and Others  Outcome: Ongoing (interventions implemented as appropriate)   09/15/18 0300   Overarching Goals (Adult)   Adheres to Safety Considerations for Self and Others making progress toward outcome     Goal: Optimized Coping Skills in Response to Life Stressors  Outcome: Ongoing (interventions implemented as appropriate)   09/15/18 0300   Overarching Goals (Adult)   Optimized Coping Skills in Response to Life Stressors making progress toward outcome     Goal: Develops/Participates in Therapeutic [place] to Support Successful Transition  Outcome: Ongoing (interventions implemented as appropriate)   09/15/18 0300   Overarching Goals (Adult)   Develops/Participates in Therapeutic [place] to Support Successful Transition making progress toward outcome       Problem: Suicidal Behavior (Adult)  Goal: Suicidal Behavior is Absent/Minimized/Managed  Outcome: Ongoing " (interventions implemented as appropriate)   09/15/18 0300   Suicidal Behavior is Absent/Minimized/Managed   Suicidal Behavior Managed/Minimized Action Step (STG) Outcome making progress toward outcome       Problem: Skin Injury Risk (Adult)  Goal: Skin Health and Integrity  Outcome: Ongoing (interventions implemented as appropriate)   09/15/18 0300   Skin Injury Risk (Adult)   Skin Health and Integrity making progress toward outcome

## 2018-09-16 RX ADMIN — GABAPENTIN 300 MG: 300 CAPSULE ORAL at 21:02

## 2018-09-16 RX ADMIN — MIRTAZAPINE 15 MG: 15 TABLET, FILM COATED ORAL at 21:02

## 2018-09-16 RX ADMIN — CLONAZEPAM 0.5 MG: 0.5 TABLET ORAL at 21:02

## 2018-09-16 RX ADMIN — MELOXICAM 15 MG: 15 TABLET ORAL at 08:07

## 2018-09-16 RX ADMIN — CLONAZEPAM 0.5 MG: 0.5 TABLET ORAL at 15:49

## 2018-09-16 RX ADMIN — CLONAZEPAM 0.5 MG: 0.5 TABLET ORAL at 08:07

## 2018-09-16 RX ADMIN — ACETAMINOPHEN 650 MG: 325 TABLET ORAL at 12:25

## 2018-09-16 NOTE — PLAN OF CARE
Problem: Patient Care Overview  Goal: Plan of Care Review  Outcome: Ongoing (interventions implemented as appropriate)   09/16/18 1514   OTHER   Outcome Summary The patient denied any depression, SI, HI, or hallucinations. Originally the patient denied anxiety but later voiced some regarding upcoming discharge and living situation. The patient complained of some back pain but reported complete relief from scheduled meloxicam. Later in the day she voiced a headache and reported minimal relief from PRN Tylenol but did report relief after her shower. Cooperative with medications. Attending groups. Continuing to monitor.    Plan of Care Review   Progress improving   Coping/Psychosocial   Plan of Care Reviewed With patient   Coping/Psychosocial   Patient Agreement with Plan of Care agrees     Goal: Individualization and Mutuality  Outcome: Ongoing (interventions implemented as appropriate)    Goal: Discharge Needs Assessment  Outcome: Ongoing (interventions implemented as appropriate)    Goal: Interprofessional Rounds/Family Conf  Outcome: Ongoing (interventions implemented as appropriate)      Problem: Overarching Goals (Adult)  Goal: Adheres to Safety Considerations for Self and Others  Outcome: Ongoing (interventions implemented as appropriate)   09/16/18 1514   Overarching Goals (Adult)   Adheres to Safety Considerations for Self and Others making progress toward outcome     Intervention: Develop and Maintain Individualized Safety Plan   09/16/18 0850   C-SSRS (Recent)   Wish to be Dead no   Suicidal Thoughts no   Suicide Behavior no   Violence Risk   Feels Like Hurting Others no   Develop and Maintain Individualized Safety Plan   Safety Measures safety rounds completed;suicide assessment completed       Goal: Optimized Coping Skills in Response to Life Stressors  Outcome: Ongoing (interventions implemented as appropriate)   09/16/18 1514   Overarching Goals (Adult)   Optimized Coping Skills in Response to Life  Stressors making progress toward outcome     Intervention: Promote Effective Coping Strategies   09/16/18 0850   Coping/Psychosocial Interventions   Supportive Measures active listening utilized;relaxation techniques promoted;self-care encouraged;self-reflection promoted;self-responsibility promoted;verbalization of feelings encouraged       Goal: Develops/Participates in Therapeutic [place] to Support Successful Transition  Outcome: Ongoing (interventions implemented as appropriate)   09/16/18 1514   Overarching Goals (Adult)   Develops/Participates in Therapeutic [place] to Support Successful Transition making progress toward outcome     Intervention: Foster Therapeutic [place]   09/16/18 0850   Interventions   Trust Relationship/Rapport care explained;choices provided;empathic listening provided;emotional support provided;questions answered;questions encouraged;reassurance provided;thoughts/feelings acknowledged         Problem: Suicidal Behavior (Adult)  Goal: Suicidal Behavior is Absent/Minimized/Managed  Outcome: Ongoing (interventions implemented as appropriate)   09/16/18 1514   OTHER   Action Step/Short Term Goal (STG) Established 09/20/18   Suicidal Behavior is Absent/Minimized/Managed   Suicidal Behavior Managed/Minimized Time Frame for Action Step (STG) 4 days   Suicidal Behavior Managed/Minimized Action Step (STG) Outcome making progress toward outcome       Problem: Skin Injury Risk (Adult)  Goal: Skin Health and Integrity  Outcome: Ongoing (interventions implemented as appropriate)   09/16/18 1514   Skin Injury Risk (Adult)   Skin Health and Integrity making progress toward outcome

## 2018-09-16 NOTE — PLAN OF CARE
Problem: Patient Care Overview  Goal: Plan of Care Review  Outcome: Ongoing (interventions implemented as appropriate)   09/15/18 1550 09/15/18 2216 09/16/18 0300   OTHER   Outcome Summary --  --  Pt rated depression 2, denied all other issues, no thoughts to hurt self or others. Cooperative and compliant with medications. Sitter remains with pt for safety. Pt present out in [facility] [place] [place], attends group, and appears brighter. Pt stated the boyfriend has agreed to let her stay in one house while he stays at another until shes able to get a bed at [facility]. Will continue to monitor.   Plan of Care Review   Progress improving --  --    Coping/Psychosocial   Plan of Care Reviewed With --  patient --    Coping/Psychosocial   Patient Agreement with Plan of Care --  agrees --        Problem: Overarching Goals (Adult)  Goal: Adheres to Safety Considerations for Self and Others  Outcome: Ongoing (interventions implemented as appropriate)   09/16/18 0300   Overarching Goals (Adult)   Adheres to Safety Considerations for Self and Others making progress toward outcome     Goal: Optimized Coping Skills in Response to Life Stressors  Outcome: Ongoing (interventions implemented as appropriate)   09/16/18 0300   Overarching Goals (Adult)   Optimized Coping Skills in Response to Life Stressors making progress toward outcome     Goal: Develops/Participates in Therapeutic [place] to Support Successful Transition  Outcome: Ongoing (interventions implemented as appropriate)   09/16/18 0300   Overarching Goals (Adult)   Develops/Participates in Therapeutic [place] to Support Successful Transition making progress toward outcome       Problem: Suicidal Behavior (Adult)  Goal: Suicidal Behavior is Absent/Minimized/Managed  Outcome: Ongoing (interventions implemented as appropriate)   09/16/18 0300   Suicidal Behavior is Absent/Minimized/Managed   Suicidal Behavior Managed/Minimized Action Step (STG) Outcome making progress toward outcome        Problem: Skin Injury Risk (Adult)  Goal: Skin Health and Integrity  Outcome: Ongoing (interventions implemented as appropriate)   09/16/18 0300   Skin Injury Risk (Adult)   Skin Health and Integrity making progress toward outcome

## 2018-09-16 NOTE — PROGRESS NOTES
The patient is active within the therapeutic milieu.  She reports that she is feeling a bit more anxious today and is expressing some ambivalence regarding discharge.  I've explained to the patient that it is normal to feel some increased anxiety shortly prior to discharge from the hospital and we continue to expect a.m. discharge.

## 2018-09-17 VITALS
SYSTOLIC BLOOD PRESSURE: 111 MMHG | DIASTOLIC BLOOD PRESSURE: 68 MMHG | TEMPERATURE: 98 F | HEART RATE: 58 BPM | OXYGEN SATURATION: 97 % | RESPIRATION RATE: 16 BRPM

## 2018-09-17 RX ORDER — CLONAZEPAM 0.5 MG/1
0.5 TABLET ORAL 3 TIMES DAILY
Qty: 90 TABLET | Refills: 1 | Status: SHIPPED | OUTPATIENT
Start: 2018-09-17

## 2018-09-17 RX ORDER — MIRTAZAPINE 15 MG/1
15 TABLET, FILM COATED ORAL NIGHTLY
Qty: 30 TABLET | Refills: 1 | Status: SHIPPED | OUTPATIENT
Start: 2018-09-17 | End: 2022-06-23

## 2018-09-17 RX ADMIN — CLONAZEPAM 0.5 MG: 0.5 TABLET ORAL at 08:46

## 2018-09-17 RX ADMIN — MELOXICAM 15 MG: 15 TABLET ORAL at 08:46

## 2018-09-17 RX ADMIN — CLONAZEPAM 0.5 MG: 0.5 TABLET ORAL at 16:47

## 2018-09-17 NOTE — DISCHARGE SUMMARY
DATES OF ADMISSION: 9/7/2018-9/17/2018    REASON FOR ADMISSION: The patient is a 48-year-old white female admitted with increasing depression and thoughts of suicide.    LABS:  See chart    HOSPITAL COURSE:  Patient was admitted to the crisis management unit and placed on suicide precautions.  These were continued throughout the patient's stay in the hospital secondary to unit guidelines but the patient was able to promise safety shortly after admission the hospital.  She was begun on Remeron 15 mg bedtime to address symptoms of depression as she had reported a history of poor response to selective serotonin reuptake inhibitors.  She tolerated the medication well and reported improvement in sleep with this medication.  The patient initially identified her boyfriend's constant use of marijuana as a stressor and she initially stated that she did not wish to return to the home they previously shared.  For time there was talk of a rehabilitation stay for the patient, but the patient refused to comply with physical therapy and thus this disposition plan was abandoned.  By 917 the patient made arrangements to return to the home she shared with her boyfriend with home health follow-up.  Discharge was ordered.  Final diagnosis    FINAL DIAGNOSIS:  Major depressive disorder recurrent moderate, Friedreich's ataxia    DISPOSITION ON DISCHARGE:  A full listing of the patient's medications is provided below.  Follow-up will take place through the auspices of home health and community mental health resources in the [facility].    PROGNOSIS: Good       Your medication list      START taking these medications      Instructions Last Dose Given Next Dose Due   mirtazapine 15 MG tablet  Commonly known as:  REMERON      Take 1 tablet by mouth Every Night.          CHANGE how you take these medications      Instructions Last Dose Given Next Dose Due   clonazePAM 0.5 MG tablet  Commonly known as:  KlonoPIN  What  changed:  · medication strength  · when to take this  · reasons to take this      Take 1 tablet by mouth 3 (Three) Times a Day.          CONTINUE taking these medications      Instructions Last Dose Given Next Dose Due   baclofen 10 MG tablet  Commonly known as:  LIORESAL      Take 10 mg by mouth 2 (two) times a day as needed for muscle spasms.       gabapentin 600 MG tablet  Commonly known as:  NEURONTIN      Take 600 mg by mouth 2 (two) times a day.       gabapentin 300 MG capsule  Commonly known as:  NEURONTIN      300 mg every night at bedtime.       meloxicam 15 MG tablet  Commonly known as:  MOBIC      Take 15 mg by mouth daily.       albuterol 108 (90 Base) MCG/ACT inhaler  Commonly known as:  PROVENTIL HFA;VENTOLIN HFA      Inhale 2 puffs 4 (four) times a day.       PROAIR  (90 Base) MCG/ACT inhaler  Generic drug:  albuterol                 Where to Get Your Medications      You can get these medications from any pharmacy    Bring a paper prescription for each of these medications  · clonazePAM 0.5 MG tablet  · mirtazapine 15 MG tablet

## 2018-09-17 NOTE — PLAN OF CARE
"Problem: Patient Care Overview  Goal: Plan of Care Review  Outcome: Ongoing (interventions implemented as appropriate)   09/16/18 1514 09/16/18 2245 09/17/18 0233   OTHER   Outcome Summary --  --  Pt rated anxiety \"low, about 3\" pt stated that was due to discharging tomorrow. Pt stated she has decided to give her 17 yr relationship another try, want to see if things can work out d/t \"too much time invested.\" Pt cooperative and compliant with medications, sitter remains at bedside for safety, will continue to monitor.   Plan of Care Review   Progress improving --  --    Coping/Psychosocial   Plan of Care Reviewed With --  patient --    Coping/Psychosocial   Patient Agreement with Plan of Care --  agrees --        Problem: Overarching Goals (Adult)  Goal: Adheres to Safety Considerations for Self and Others  Outcome: Ongoing (interventions implemented as appropriate)   09/17/18 0233   Overarching Goals (Adult)   Adheres to Safety Considerations for Self and Others making progress toward outcome     Goal: Optimized Coping Skills in Response to Life Stressors  Outcome: Ongoing (interventions implemented as appropriate)   09/17/18 0233   Overarching Goals (Adult)   Optimized Coping Skills in Response to Life Stressors making progress toward outcome     Goal: Develops/Participates in Therapeutic Flushing to Support Successful Transition  Outcome: Ongoing (interventions implemented as appropriate)   09/17/18 0233   Overarching Goals (Adult)   Develops/Participates in Therapeutic Flushing to Support Successful Transition making progress toward outcome       Problem: Suicidal Behavior (Adult)  Goal: Suicidal Behavior is Absent/Minimized/Managed  Outcome: Ongoing (interventions implemented as appropriate)   09/17/18 0233   Suicidal Behavior is Absent/Minimized/Managed   Suicidal Behavior Managed/Minimized Action Step (STG) Outcome making progress toward outcome       Problem: Skin Injury Risk (Adult)  Goal: Skin Health and " Integrity  Outcome: Ongoing (interventions implemented as appropriate)   09/17/18 5147   Skin Injury Risk (Adult)   Skin Health and Integrity making progress toward outcome

## 2018-09-17 NOTE — PROGRESS NOTES
Continued Stay Note  [facility]     Patient Name: Lyly Zeng  MRN: [id number]  Today's Date: 9/17/2018    Admit Date: 9/7/2018          Discharge Plan     Row Name 09/17/18 0930       Plan    Final Discharge Disposition Code 06 - home with home health care    Final Note Pt is discharging home with home health, per Dr. Wagner's orders. Pt will receive home health through [facility] [employer] [phone] for ongoing occupational therapy and psych nursing. Pt also has a follow-up appointment at [facility] [phone] with DARYL Prado for medication management on 09/25 at 10:00a. Per [facility] office staff, pt is unable to schedule an appointment with a therapist in their office until November, which is the only therapist in their office, who accepts Medicare at this time. Pt agreed with this plan. Pt is discharging home with belongings and prescriptions. Pt remains on waiting list for [facility] for independent living. SW provided pt with depression/bipolar support groups in [place] for future reference. Pt's transportation is being provided by her boyfriend at discharge. Pt completed PHQ-9 survey at discharge.                Discharge Codes    No documentation.       Expected Discharge Date and Time     Expected Discharge Date Expected Discharge Time    Sep 17, 2018             Amanda Villavicencio LCSW

## 2020-10-28 ENCOUNTER — OFFICE VISIT CONVERTED (OUTPATIENT)
Dept: FAMILY MEDICINE CLINIC | Age: 50
End: 2020-10-28
Attending: NURSE PRACTITIONER

## 2021-04-12 DIAGNOSIS — M25.561 RIGHT KNEE PAIN, UNSPECIFIED CHRONICITY: Primary | ICD-10-CM

## 2021-04-12 RX ORDER — GABAPENTIN 400 MG/1
400 CAPSULE ORAL DAILY
COMMUNITY
Start: 2021-03-29

## 2021-04-12 RX ORDER — OMEPRAZOLE 40 MG/1
40 CAPSULE, DELAYED RELEASE ORAL DAILY
COMMUNITY
Start: 2021-02-18

## 2021-04-12 RX ORDER — MIRTAZAPINE 30 MG/1
TABLET, FILM COATED ORAL
COMMUNITY
Start: 2021-03-04 | End: 2022-06-23

## 2021-04-15 ENCOUNTER — HOSPITAL ENCOUNTER (OUTPATIENT)
Dept: OTHER | Facility: HOSPITAL | Age: 51
Discharge: HOME | End: 2021-04-15
Attending: ORTHOPAEDIC SURGERY

## 2021-04-15 ENCOUNTER — OFFICE VISIT (OUTPATIENT)
Dept: ORTHOPEDIC SURGERY | Facility: CLINIC | Age: 51
End: 2021-04-15

## 2021-04-15 VITALS — BODY MASS INDEX: 26.06 KG/M2 | HEIGHT: 61 IN | TEMPERATURE: 97.5 F | WEIGHT: 138 LBS

## 2021-04-15 DIAGNOSIS — M25.562 PAIN IN BOTH KNEES, UNSPECIFIED CHRONICITY: Primary | ICD-10-CM

## 2021-04-15 DIAGNOSIS — M25.561 PAIN IN BOTH KNEES, UNSPECIFIED CHRONICITY: Primary | ICD-10-CM

## 2021-04-15 PROCEDURE — 99213 OFFICE O/P EST LOW 20 MIN: CPT | Performed by: ORTHOPAEDIC SURGERY

## 2021-04-15 NOTE — PROGRESS NOTES
"Chief Complaint  Pain and Establish Care of the Left Knee and Pain and Establish Care of the Right Knee    Subjective    History of Present Illness      Lyly Zeng is a 50 y.o. female who presents to [facility] for bilateral knee pain and discomfort.  History of Present Illness this is a patient who is known to me from previous visits.  She is a 50-year-old white female who has a history of Friedreich's ataxia.  She has significant mobility disorder.  There is lots of issues with balance.  She has experienced multiple falls in the past.  Several times she has fallen directly over the anterior aspect of the knee.  She has sustained knee effusion episodically.  At this point she states that her knee arthritis is slowly progressive.  Unfortunately because of her ataxia she has progressed onto being limited to becoming a wheelchair ambulator.  Pain Location:  BILATERAL knee  Radiation: none  Quality: dull, aching  Intensity/Severity: mild-moderate  Duration: several years  Progression of symptoms: yes, progressive worsening  Onset quality: gradual   Timing: intermittent  Aggravating Factors: any weight bearing, going up and down stairs, kneeling, rising after sitting  Alleviating Factors: NSAIDs, rest, ice  Previous Episodes: yes  Associated Symptoms: pain, swelling, clicking/popping  ADLs Affected: ambulating, recreational activities/sports  Previous Treatment: NSAIDs       Objective   Vital Signs:   Temp 97.5 °F (36.4 °C)   Ht 154.9 cm (61\")   Wt 62.6 kg (138 lb)   BMI 26.07 kg/m²     Physical Exam  Physical Exam  Vitals signs and nursing note reviewed.   Constitutional:       Appearance: Normal appearance.   Pulmonary:      Effort: Pulmonary effort is normal.   Skin:     General: Skin is warm and dry.      Capillary Refill: Capillary refill takes less than 2 seconds.   Neurological:      General: No focal deficit present.      Mental Status: He is alert and oriented to person, " place, and time. Mental status is at baseline.   Psychiatric:         Mood and Affect: Mood normal.         Behavior: Behavior normal.         Thought Content: Thought content normal.         Judgment: Judgment normal.     Ortho Exam   Bilateral knee (varus). Patient has crepitus throughout range of motion. Positive patellar grind test. Mild effusion. Lachman is negative. Pivot shift is negative. Anterior and posterior drawer signs are negative. Significant joint line tenderness is noted on the medial aspect of the knee. Patient has a varus orientation of the knee. There is fullness and tenderness in the Popliteal fossa. Mild distention of a Popliteal cyst is noted in this location. Range of motion in flexion is from 0-100 degrees. Neurovascular status is intact.  Dorsalis pedis and posterior tibial artery pulses are palpable. Common peroneal nerve function is well preserved. Patient's gait is cautious and antalgic. Skin and soft tissues are mildly swollen, consistent with synovitis and effusion. The patient has a significant limp with the first few steps after starting the gait cycle. Getting out of a chair takes a lot of effort due to pain on knee flexion.         Result Review :   The following data was reviewed by: Emeka Trujillo MD on 04/15/2021:  Radiologic studies - see below for interpretation  Bilateral knee xrays ap/lateral views were ordered by Emeka Trujillo MD. Performed at [facility] on 04/15/21. Images were independently viewed and interpreted by myself, my impression as follows:    bilateral Knee X-Ray  Indication: Pain and discomfort on the medial aspect of both knees.  AP, Lateral views  Findings: Early narrowing of the medial joint space with a suprapatellar effusion noted.  no bony lesion  Soft tissues within normal limits  decreased joint spaces  Hardware appropriately positioned not applicable      yes prior studies available for comparison.    This patient's x-ray report  was graded according to the Kellgren and Nicho classification.  This took into account the joint space narrowing, osteophyte formation, sclerosis of the distal femur/proximal tibia along with deformity of those bones.  The findings were indicative of K L grade 2.    X-RAY was ordered and reviewed by Emeka Trujillo MD              Procedures           Assessment   Assessment and Plan    Diagnoses and all orders for this visit:    1. Pain in both knees, unspecified chronicity (Primary)  -     XR Knee 3 View Bilateral; Future  -     XR Knee 3 View Bilateral          Follow Up   · Compression/brace to the knee to manage the instability of the knee.  · Intra-articular steroid injection and viscosupplementation injections discussed and offered to the patient.  · Calcium and vitamin D for bone health.  · Falls precautions.  · At this point she is not a candidate for any form of surgical intervention.  · Discussed with the patient that if her symptoms continue to bother her we would go ahead and get an MRI to make sure that she has not actually damaged a meniscus or a medial collateral ligament during a fall.  The possibility of a occult fracture not visible on x-rays has also been discussed with the patient.  · Rest, ice, compression, and elevation (RICE) therapy  · Stretching and strengthening exercises of the quads and the hamstrings.  · OTC Ibuprofen 600mg by mouth every 6-8 hours as needed for pain and swelling  · Follow up in 6 month(s)  • Patient was given instructions and counseling regarding her condition or for health maintenance advice. Please see specific information pulled into the AVS if appropriate.     Emeka Trujillo MD   Date of Encounter: 4/15/2021        EMR Dragon/Transcription disclaimer:  Much of this encounter note is an electronic transcription/translation of spoken language to printed text. The electronic translation of spoken language may permit erroneous, or at times, nonsensical words or phrases  to be inadvertently transcribed; Although I have reviewed the note for such errors, some may still exist.

## 2021-05-18 NOTE — PROGRESS NOTES
"Lyly Zeng  1970     Office/Outpatient Visit    Visit Date: Wed, Oct 28, 2020 03:49 pm    Provider: Arely Lane N.P. (Assistant: Shirlene Monk MA)    Location: [facility]        Electronically signed by Arely Lane N.P. on  10/28/2020 09:13:45 PM                             Subjective:        CC: Ms. Zeng is a 50 year old female.  \"possible sinus infection\" ears and head hurting; tested negative for covid monday         HPI:           Ms. Zeng presents with acute upper respiratory infection, unspecified.  These have been present for the past 2 weeks.  The symptoms include productive cough, \"sinus\" headache, nasal congestion, yellow nasal discharge and wheezing.  She denies fever.  She denies exposure to ill contacts.  She has already tried to relieve the symptoms with loratadine, flonase, albuterol.   denies taking any antibiotics in the last 1-2 months.  [facility] and tested negative for covid 19.  no tx rec'd .  told her it was allergies..      ROS:     CONSTITUTIONAL:  Positive for fatigue.   Negative for chills or fever.      E/N/T:  Positive for nasal congestion and frequent rhinorrhea.   Negative for sore throat.      CARDIOVASCULAR:  Negative for chest pain, palpitations, tachycardia, orthopnea, and edema.      RESPIRATORY:  Positive for chronic cough and dyspnea.          Past Medical History / Family History / Social History:         Last Reviewed on 10/28/2020 04:15 PM by Arely Lane    Past Medical History: former pt alex franklin                 PAST MEDICAL HISTORY         Allergies: allergy testing was done yes- when?;         PREVENTIVE HEALTH MAINTENANCE             MAMMOGRAM: was last done 2020 with normal results         Surgical History:         Positive for    cysts removed from ovaries; and    right eye muscle procedure;;         Family History:         Positive for Breast Cancer ( pat. GM; pat. aunt ) and Pancreatric Cancer ( father -- "  ).          Tobacco/Alcohol/Supplements:     Last Reviewed on 10/28/2020 03:57 PM by Shirlene Monk    Tobacco: Current Smoker: She currently smokes every day, 1/2 pack per day.          Current Problems:     None Recorded        Immunizations:     None        Allergies:     Last Reviewed on 10/28/2020 03:57 PM by Shirlene Monk    aspirin:      ampicillin:          Current Medications:     Last Reviewed on 10/28/2020 03:56 PM by Shirlene Monk    amoxicillin 500 mg capsule [TAKE ONE CAPSULE BY MOUTH THREE TIMES DAILY FOR 10 DAYS]    GABAPENTIN  400 MG CAPSfor arhtritis -  jaden avery with mirtazepine, clonazepam    ALBUTEROL SULFATE HFA  108 (90 Base) MCG/ACT AERS    MELOXICAM  15 MG TABS    MIRTAZAPINE  15 MG TABS    clonazepam 0.5 mg tablet [TAKE ONE TABLET BY MOUTH TWICE DAILY AS NEEDED FOR SEVERE ANXIETY]    fluticasone propionate 50 mcg/actuation nasal spray,suspension [INSTILL TWO SPRAYS IN EACH NOSTRIL ONCE DAILY FOR 7 DAYS]        Objective:        Vitals:         Current: 10/28/2020 3:59:54 PM    Ht:  5 ft, 1 in;  Wt: 172 lbs;  BMI: 32.5T: 96.1 F (temporal);  BP: 123/69 mm Hg (left arm, sitting);  P: 75 bpm (left arm (BP Cuff), sitting)        Exams:     PHYSICAL EXAM:     GENERAL:  well developed and nourished; appropriately groomed; in no apparent distress;     E/N/T: EARS: both TMs are have fluid behind them;  OROPHARYNX: posterior pharynx, including tonsils, tongue, and uvula are normal;     RESPIRATORY: normal respiratory rate and pattern with no distress; PIETER crackles that clear with cough;     CARDIOVASCULAR: normal rate; rhythm is regular;     LYMPHATIC: no enlargement of cervical or facial nodes;     MUSCULOSKELETAL: gait: wheelchair-bound;     NEUROLOGIC: mental status: alert and oriented x 3; GROSSLY INTACT     PSYCHIATRIC:  appropriate affect and demeanor; normal speech pattern; grossly normal memory;         Assessment:         J06.9   Acute upper respiratory infection, unspecified            ORDERS:         Meds Prescribed:       [New Rx] cefdinir 300 mg oral capsule [take 1 capsule (300 mg) by oral route every 12 hours], #14 (fourteen) capsules, Refills: 0 (zero)       [New Rx] predniSONE 5 mg oral tablet [take 8 tablets on day one, 6 tablets on day 2, 4 tablets on day 3, 2 tablets on day 4 and 1 tablet on day 5], #21 (twenty one) tablets, Refills: 0 (zero)                 Plan:         Acute upper respiratory infection, unspecified        RECOMMENDATIONS given include: increase fluid intake and follow up if not improving.  to ER if worsens..            Prescriptions:       [New Rx] cefdinir 300 mg oral capsule [take 1 capsule (300 mg) by oral route every 12 hours], #14 (fourteen) capsules, Refills: 0 (zero)       [New Rx] predniSONE 5 mg oral tablet [take 8 tablets on day one, 6 tablets on day 2, 4 tablets on day 3, 2 tablets on day 4 and 1 tablet on day 5], #21 (twenty one) tablets, Refills: 0 (zero)             Patient Recommendations:        For  Acute upper respiratory infection, unspecified:    Drink plenty of fluids.  Fever increases the loss of fluids and can lead to dehydration.              Charge Capture:         Primary Diagnosis:     J06.9  Acute upper respiratory infection, unspecified           Orders:      43389  Office visit - new pt, level 2  (In-House)

## 2021-07-02 VITALS
BODY MASS INDEX: 32.47 KG/M2 | HEART RATE: 75 BPM | WEIGHT: 172 LBS | DIASTOLIC BLOOD PRESSURE: 69 MMHG | SYSTOLIC BLOOD PRESSURE: 123 MMHG | HEIGHT: 61 IN | TEMPERATURE: 96.1 F

## 2021-12-07 ENCOUNTER — TELEPHONE (OUTPATIENT)
Dept: ORTHOPEDIC SURGERY | Facility: CLINIC | Age: 51
End: 2021-12-07

## 2021-12-07 NOTE — TELEPHONE ENCOUNTER
Spoke to the patient.  We will not have any availability as Dr. Trujillo is out a week in December and out a day in January.  She wishes to keep the February appointment.

## 2021-12-07 NOTE — TELEPHONE ENCOUNTER
Caller: KELLY RAMIREZ    Relationship to patient: SELF    Best call back number:     Chief complaint: RIGHT SHOULDER AND BACK PAIN     Type of visit:FUP    Requested date: ASAP    If rescheduling, when is the original appointment:2/17/22    Additional notes:PATIENT IS HAVING RIGHT SHOULDER PAIN AND NEXT AVAILABLKE IS 2/17/22.  SHE WOULD LIKE TO BE SEEN SOONER IF POSSIBLE.  SHE IS ALSO HAVING BACK PAIN AND WAS WONDERING IF WE COULD SEND A REFERRAL TO DR VAZQUEZ IN [place].  SHE SAID WOULD GO TO [place] TO GET SHOULDER SEEN AS WELL IF WE COULD GET SEEN SOONER

## 2022-02-14 DIAGNOSIS — M25.511 RIGHT SHOULDER PAIN, UNSPECIFIED CHRONICITY: Primary | ICD-10-CM

## 2022-02-24 ENCOUNTER — HOSPITAL ENCOUNTER (OUTPATIENT)
Dept: GENERAL RADIOLOGY | Facility: HOSPITAL | Age: 52
Discharge: HOME | End: 2022-02-24
Admitting: ORTHOPAEDIC SURGERY

## 2022-02-24 ENCOUNTER — OFFICE VISIT (OUTPATIENT)
Dept: ORTHOPEDIC SURGERY | Facility: CLINIC | Age: 52
End: 2022-02-24

## 2022-02-24 VITALS — WEIGHT: 138 LBS | BODY MASS INDEX: 26.06 KG/M2 | HEIGHT: 61 IN | TEMPERATURE: 96 F

## 2022-02-24 DIAGNOSIS — M25.511 RIGHT SHOULDER PAIN, UNSPECIFIED CHRONICITY: ICD-10-CM

## 2022-02-24 DIAGNOSIS — M25.511 RIGHT SHOULDER PAIN, UNSPECIFIED CHRONICITY: Primary | ICD-10-CM

## 2022-02-24 DIAGNOSIS — G11.11: ICD-10-CM

## 2022-02-24 PROCEDURE — 73030 X-RAY EXAM OF SHOULDER: CPT

## 2022-02-24 PROCEDURE — 99213 OFFICE O/P EST LOW 20 MIN: CPT | Performed by: ORTHOPAEDIC SURGERY

## 2022-02-24 PROCEDURE — 20610 DRAIN/INJ JOINT/BURSA W/O US: CPT | Performed by: ORTHOPAEDIC SURGERY

## 2022-02-24 RX ORDER — FLUOXETINE HYDROCHLORIDE 20 MG/1
CAPSULE ORAL
COMMUNITY
Start: 2022-02-14 | End: 2022-06-23

## 2022-02-24 RX ADMIN — METHYLPREDNISOLONE ACETATE 160 MG: 80 INJECTION, SUSPENSION INTRA-ARTICULAR; INTRALESIONAL; INTRAMUSCULAR; SOFT TISSUE at 13:41

## 2022-02-24 RX ADMIN — LIDOCAINE HYDROCHLORIDE 2 ML: 10 INJECTION, SOLUTION EPIDURAL; INFILTRATION; INTRACAUDAL; PERINEURAL at 13:41

## 2022-02-25 ENCOUNTER — TELEPHONE (OUTPATIENT)
Dept: ORTHOPEDIC SURGERY | Facility: CLINIC | Age: 52
End: 2022-02-25

## 2022-02-25 NOTE — TELEPHONE ENCOUNTER
That is the correct answer. She is most likely experiencing a cortisone flare. If it would help I can call her in some Ultram 50 mg tab 1 p.o. nightly as needed pain and discomfort. Total 30 pills. Please asked the patient if she would like me to call that prescription in for her. Please also make sure that she is not getting medication from a pain clinic and this would be in violation of her contract with that office.

## 2022-02-25 NOTE — TELEPHONE ENCOUNTER
PATIENT CALLED AND STATED THAT HER SHOULDER IS HURTING WORSE TODAY THAN IT WAS BEFORE HER INJECTION YESTERDAY.  SHE STATED THAT SHE IS EVEN STRUGGLING TO PULL HER PANTS UP.  SHE USED ICE AND HEAT THROUGH THE NIGHT AND TOOK IBUPROFEN, BUT NOTHING HELPED.  I EXPLAINED THAT THIS COULD BE A CORTISONE FLARE AND THAT THE DISCOMFORT COULD LAST A COUPLE OF DAYS.  SHE EXPRESSED UNDERSTANDING AND WANTED ME TO CHECK WITH DR. SCHAFFER TO SEE IF THERE IS ANYTHING ELSE SHE CAN DO TO ALLEVIATE THE PAIN.    PLEASE ADVISE

## 2022-02-25 NOTE — TELEPHONE ENCOUNTER
"CALLED PATIENT AND LET HER KNOW THAT DR. SCHAFFER AGREED THAT SHE IS MOST LIKELY EXPERIENCING A CORTISONE FLARE AND THAT HE OFFERED TO PRESCRIBE ULTRAM FOR HER PAIN.  SHE STATED THAT SHE IS ALLERGIC TO ULTRAM AND THAT SHE HAS BEEN TRYING TO \"WORK IT OUT\" TODAY AND IS DOING SOME BETTER.  SHE WILL CALL OUR OFFICE ON Monday IF SHE IS STILL HAVING PROBLEMS.  "

## 2022-03-07 RX ORDER — LIDOCAINE HYDROCHLORIDE 10 MG/ML
2 INJECTION, SOLUTION EPIDURAL; INFILTRATION; INTRACAUDAL; PERINEURAL
Status: COMPLETED | OUTPATIENT
Start: 2022-02-24 | End: 2022-02-24

## 2022-03-07 RX ORDER — METHYLPREDNISOLONE ACETATE 80 MG/ML
160 INJECTION, SUSPENSION INTRA-ARTICULAR; INTRALESIONAL; INTRAMUSCULAR; SOFT TISSUE
Status: COMPLETED | OUTPATIENT
Start: 2022-02-24 | End: 2022-02-24

## 2022-05-31 ENCOUNTER — TELEPHONE (OUTPATIENT)
Dept: ORTHOPEDIC SURGERY | Facility: CLINIC | Age: 52
End: 2022-05-31

## 2022-05-31 NOTE — TELEPHONE ENCOUNTER
"    Caller: PATIENT     Relationship to patient: SELF     Best call back number:  [phone]      Chief complaint: RIGHT SHOULDER PAIN     Type of visit: FOLLOW UP RIGHT SHOULDER PAIN    Requested date: IN THE NEXT WEEK OR TWO     If rescheduling, when is the original appointment:  08/25/22     Additional notes: PT. HAD TO CXL AND RESCHEDULE HER APPT. - NEXT OPENING WASN'T UNTIL 08/25/22.   PT. STATES SHE IS HAVING MORE RIGHT SHOULDER PAIN, AND ALSO \"FEELS PINS AND NEEDLES'   ASKING IF SHE CAN BE WORKED IN ANY SOONER.             "

## 2022-06-22 ENCOUNTER — TELEPHONE (OUTPATIENT)
Dept: ORTHOPEDIC SURGERY | Facility: CLINIC | Age: 52
End: 2022-06-22

## 2022-06-22 DIAGNOSIS — G89.29 CHRONIC RIGHT SHOULDER PAIN: ICD-10-CM

## 2022-06-22 DIAGNOSIS — M75.42 IMPINGEMENT SYNDROME OF BOTH SHOULDERS: ICD-10-CM

## 2022-06-22 DIAGNOSIS — G56.01 CARPAL TUNNEL SYNDROME OF RIGHT WRIST: ICD-10-CM

## 2022-06-22 DIAGNOSIS — M77.8 TENDINITIS OF THUMB: ICD-10-CM

## 2022-06-22 DIAGNOSIS — M77.01 MEDIAL EPICONDYLITIS OF RIGHT ELBOW: ICD-10-CM

## 2022-06-22 DIAGNOSIS — M75.41 IMPINGEMENT SYNDROME OF BOTH SHOULDERS: ICD-10-CM

## 2022-06-22 DIAGNOSIS — M25.511 RIGHT SHOULDER PAIN, UNSPECIFIED CHRONICITY: Primary | ICD-10-CM

## 2022-06-22 DIAGNOSIS — M25.511 CHRONIC RIGHT SHOULDER PAIN: ICD-10-CM

## 2022-06-22 NOTE — TELEPHONE ENCOUNTER
PT CALLED TO CHECK ON THE STATUS OF THIS REQUEST. ADVISED HER THAT SOMEONE WOULD BE CONTACTING HER.

## 2022-06-22 NOTE — TELEPHONE ENCOUNTER
Caller: Lyly Zeng    Relationship: Self    Best call back number: [phone]    What is the best time to reach you: ANY    What was the call regarding: PT SAYS SHE HAS A RT ROTATOR CUFF TEAR, TENDONITIS IN RT ARM AND CARPAL TUNNEL IN RT HAND. PT IS HAVING A LOT OF PAIN. SHE HAS A VISIT SCHEDULED FOR 8.11.2022. SHE WOULD LIKE TO KNOW IF SHE CAN HAVE A PHYSICAL THERAPY ORDER OR SOME OTHER ORDER TO RELIEVE HER PAIN BEFORE THEN. SHE DOES NOT WANT PAIN MEDICATION.     Do you require a callback: YES

## 2022-06-23 ENCOUNTER — OFFICE VISIT (OUTPATIENT)
Dept: FAMILY MEDICINE CLINIC | Age: 52
End: 2022-06-23

## 2022-06-23 VITALS
TEMPERATURE: 97.3 F | DIASTOLIC BLOOD PRESSURE: 63 MMHG | HEART RATE: 65 BPM | SYSTOLIC BLOOD PRESSURE: 123 MMHG | WEIGHT: 176.4 LBS | BODY MASS INDEX: 33.3 KG/M2 | HEIGHT: 61 IN

## 2022-06-23 DIAGNOSIS — F34.1 DYSTHYMIC DISORDER: ICD-10-CM

## 2022-06-23 DIAGNOSIS — G11.11 FRIEDREICH'S ATAXIA: ICD-10-CM

## 2022-06-23 DIAGNOSIS — K21.9 GERD WITHOUT ESOPHAGITIS: ICD-10-CM

## 2022-06-23 DIAGNOSIS — J43.8 OTHER EMPHYSEMA: Primary | ICD-10-CM

## 2022-06-23 DIAGNOSIS — J30.89 OTHER ALLERGIC RHINITIS: ICD-10-CM

## 2022-06-23 DIAGNOSIS — E78.00 ELEVATED CHOLESTEROL: ICD-10-CM

## 2022-06-23 PROBLEM — Z72.0 NICOTINE VAPOR PRODUCT USER: Status: ACTIVE | Noted: 2022-06-23

## 2022-06-23 PROBLEM — M25.511 RIGHT SHOULDER PAIN: Status: RESOLVED | Noted: 2018-02-27 | Resolved: 2022-06-23

## 2022-06-23 PROCEDURE — 99214 OFFICE O/P EST MOD 30 MIN: CPT | Performed by: FAMILY MEDICINE

## 2022-06-23 RX ORDER — IBUPROFEN 200 MG
200 TABLET ORAL EVERY 6 HOURS PRN
COMMUNITY

## 2022-06-23 RX ORDER — LANOLIN ALCOHOL/MO/W.PET/CERES
1000 CREAM (GRAM) TOPICAL DAILY
COMMUNITY

## 2022-06-23 RX ORDER — LORATADINE 10 MG/1
10 TABLET ORAL DAILY
COMMUNITY

## 2022-06-23 RX ORDER — IPRATROPIUM BROMIDE 42 UG/1
2 SPRAY, METERED NASAL 4 TIMES DAILY
COMMUNITY

## 2022-06-23 NOTE — PROGRESS NOTES
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   --ALLERGIC RHINITIS ONLY S/W BETTER WITH OTC MEDS          Allergies   Allergen Reactions   • Ampicillin Hives     Unknown//cant remember   • Aspirin GI Bleeding     Makes her stomach bleed   • Oxcarbazepine Itching        Health Maintenance Due   Topic Date Due   • MAMMOGRAM  Never done   • DXA SCAN  Never done   • COLORECTAL CANCER SCREENING  Never done   • Pneumococcal Vaccine 0-64 (1 - PCV) Never done   • TDAP/TD VACCINES (1 - Tdap) Never done   • HEPATITIS C SCREENING  Never done   • ANNUAL WELLNESS VISIT  Never done   • PAP SMEAR  Never done   • ZOSTER VACCINE (1 of 2) Never done   • LUNG CANCER SCREENING  Never done   • COVID-19 Vaccine (3 - Booster for Pfizer series) 10/07/2021   • LIPID PANEL  06/23/2022        Current Outpatient Medications on File Prior to Visit   Medication Sig   • albuterol (PROVENTIL HFA;VENTOLIN HFA) 108 (90 BASE) MCG/ACT inhaler Inhale 2 puffs 4 (four) times a day.   • ASHWAGANDHA PO Take  by mouth.   • Calcium Carb-Cholecalciferol (CALCIUM 600 + D PO) Take  by mouth.   • clonazePAM (KlonoPIN) 0.5 MG tablet Take 1 tablet by mouth 3 (Three) Times a Day. (Patient taking differently: Take 0.5 mg by mouth 2 (Two) Times a Day As Needed. Indications: anx)   • gabapentin (NEURONTIN) 400 MG capsule Take 400 mg by mouth Daily.   • ibuprofen (ADVIL,MOTRIN) 200 MG tablet Take 200 mg by mouth Every 6 (Six) Hours As Needed for Mild Pain .   • ipratropium (ATROVENT) 0.06 % nasal spray 2 sprays into the nostril(s) as  "directed by provider 4 (Four) Times a Day.   • loratadine (CLARITIN) 10 MG tablet Take 10 mg by mouth Daily.   • meloxicam (MOBIC) 15 MG tablet Take 15 mg by mouth daily.   • omeprazole (priLOSEC) 40 MG capsule Take 40 mg by mouth Daily.   • Probiotic Product (PROBIOTIC PO) Take  by mouth.   • TURMERIC PO Take  by mouth.   • vitamin B-12 (CYANOCOBALAMIN) 1000 MCG tablet Take 1,000 mcg by mouth Daily.   • [DISCONTINUED] baclofen (LIORESAL) 10 MG tablet Take 10 mg by mouth 2 (two) times a day as needed for muscle spasms.   • [DISCONTINUED] FLUoxetine (PROzac) 20 MG capsule    • [DISCONTINUED] gabapentin (NEURONTIN) 300 MG capsule 300 mg every night at bedtime.   • [DISCONTINUED] gabapentin (NEURONTIN) 600 MG tablet Take 600 mg by mouth 2 (two) times a day.   • [DISCONTINUED] mirtazapine (REMERON) 15 MG tablet Take 1 tablet by mouth Every Night.   • [DISCONTINUED] mirtazapine (REMERON) 30 MG tablet    • [DISCONTINUED] PROAIR  (90 BASE) MCG/ACT inhaler      No current facility-administered medications on file prior to visit.       Immunization History   Administered Date(s) Administered   • COVID-19 (PFIZER) PURPLE CAP 04/16/2021, 05/07/2021       Review of Systems   Constitutional: Positive for fatigue. Negative for activity change, appetite change, chills and fever.   HENT: Positive for congestion, ear pain and rhinorrhea. Negative for sore throat.    Respiratory: Negative for cough and shortness of breath.    Cardiovascular: Negative for chest pain, palpitations and leg swelling.   Gastrointestinal: Negative for abdominal pain, constipation, diarrhea, nausea and vomiting.   Musculoskeletal: Negative for arthralgias and myalgias.   Neurological: Negative for headache.        Objective     /63 (BP Location: Left arm, Patient Position: Sitting)   Pulse 65   Temp 97.3 °F (36.3 °C) (Oral)   Ht 154.9 cm (61\")   Wt 80 kg (176 lb 6.4 oz)   BMI 33.33 kg/m²       Physical Exam  Vitals and nursing note " reviewed.   Constitutional:       General: She is not in acute distress.     Appearance: Normal appearance.   HENT:      Right Ear: Tympanic membrane normal.      Left Ear: Tympanic membrane normal.      Mouth/Throat:      Pharynx: Oropharynx is clear.   Eyes:      Conjunctiva/sclera: Conjunctivae normal.   Cardiovascular:      Rate and Rhythm: Normal rate and regular rhythm.      Heart sounds: Normal heart sounds. No murmur heard.  Pulmonary:      Effort: Pulmonary effort is normal.      Breath sounds: Normal breath sounds.   Abdominal:      Palpations: Abdomen is soft.      Tenderness: There is no abdominal tenderness.   Musculoskeletal:      Cervical back: Neck supple.      Right lower leg: No edema.      Left lower leg: No edema.   Lymphadenopathy:      Cervical: No cervical adenopathy.   Neurological:      General: No focal deficit present.      Mental Status: She is alert.      Cranial Nerves: No cranial nerve deficit.      Coordination: Coordination normal.      Gait: Gait normal.   Psychiatric:         Mood and Affect: Mood normal.         Behavior: Behavior normal.         Result Review :                             Assessment and Plan      Diagnoses and all orders for this visit:    1. Other emphysema (HCC) (Primary)  Assessment & Plan:  COPD is improving with treatment.  Discussed monitoring symptoms and use of quick-relief medications and contacting us early in the course of exacerbations.  Counseled to avoid exposure to cigarette smoke.          2. GERD without esophagitis  Assessment & Plan:  IMPROVED BUT POSSIBLE SIDE EFFECTS OF THE PPI.  CONSIDER CHANGING TO AN H-2 BLOCKER OF DOING A GB WORKUP       3. Friedreich's ataxia (Neurology)  Assessment & Plan:  SHE WILL F/U WITH NEUROLOGY AS PLANNED       4. Elevated cholesterol  Assessment & Plan:  Lipid abnormalities are newly identified.  Nutritional counseling was provided.  Lipids will be reassessed in 3 months   OUTSIDE LABS REVIEWED, WILL RECHECK AT  NEXT VISIT .      5. Dysthymic disorder (Psychiatry)   Assessment & Plan:  STABLE CURRENTLY, PLANS PER PSYCHIATRY       6. Other allergic rhinitis  Assessment & Plan:  NOT AT GOAL, CONTINUE OTC MEDS FOR NOW, CONSIDER A TRIAL OF SINGULAIR             Follow Up     Return in about 3 months (around 9/23/2022).    Patient was given instructions and counseling regarding her condition or for health maintenance advice. Please see specific information pulled into the AVS if appropriate.

## 2022-06-23 NOTE — ASSESSMENT & PLAN NOTE
IMPROVED BUT POSSIBLE SIDE EFFECTS OF THE PPI.  CONSIDER CHANGING TO AN H-2 BLOCKER OF DOING A GB WORKUP

## 2022-06-23 NOTE — ASSESSMENT & PLAN NOTE
Lipid abnormalities are newly identified.  Nutritional counseling was provided.  Lipids will be reassessed in 3 months   OUTSIDE LABS REVIEWED, WILL RECHECK AT NEXT VISIT .

## 2022-06-23 NOTE — ASSESSMENT & PLAN NOTE
COPD is improving with treatment.  Discussed monitoring symptoms and use of quick-relief medications and contacting us early in the course of exacerbations.  Counseled to avoid exposure to cigarette smoke.

## 2022-07-13 ENCOUNTER — TRANSCRIBE ORDERS (OUTPATIENT)
Dept: ADMINISTRATIVE | Facility: HOSPITAL | Age: 52
End: 2022-07-13

## 2022-07-13 DIAGNOSIS — J32.9 CHRONIC SINUSITIS, UNSPECIFIED LOCATION: Primary | ICD-10-CM

## 2022-08-08 DIAGNOSIS — M25.511 RIGHT SHOULDER PAIN, UNSPECIFIED CHRONICITY: Primary | ICD-10-CM

## 2022-08-11 ENCOUNTER — OFFICE VISIT (OUTPATIENT)
Dept: ORTHOPEDIC SURGERY | Facility: CLINIC | Age: 52
End: 2022-08-11

## 2022-08-11 ENCOUNTER — HOSPITAL ENCOUNTER (OUTPATIENT)
Dept: GENERAL RADIOLOGY | Facility: HOSPITAL | Age: 52
Discharge: HOME | End: 2022-08-11
Admitting: ORTHOPAEDIC SURGERY

## 2022-08-11 VITALS — HEIGHT: 61 IN | BODY MASS INDEX: 33.23 KG/M2 | WEIGHT: 176 LBS | TEMPERATURE: 98.6 F

## 2022-08-11 DIAGNOSIS — M25.511 RIGHT SHOULDER PAIN, UNSPECIFIED CHRONICITY: ICD-10-CM

## 2022-08-11 DIAGNOSIS — M25.521 RIGHT ELBOW PAIN: ICD-10-CM

## 2022-08-11 DIAGNOSIS — M75.41 IMPINGEMENT SYNDROME OF RIGHT SHOULDER: Primary | ICD-10-CM

## 2022-08-11 PROCEDURE — 99213 OFFICE O/P EST LOW 20 MIN: CPT | Performed by: ORTHOPAEDIC SURGERY

## 2022-08-11 PROCEDURE — 20610 DRAIN/INJ JOINT/BURSA W/O US: CPT | Performed by: ORTHOPAEDIC SURGERY

## 2022-08-11 PROCEDURE — 73030 X-RAY EXAM OF SHOULDER: CPT

## 2022-08-11 RX ADMIN — METHYLPREDNISOLONE ACETATE 160 MG: 80 INJECTION, SUSPENSION INTRA-ARTICULAR; INTRALESIONAL; INTRAMUSCULAR; SOFT TISSUE at 16:33

## 2022-08-11 RX ADMIN — LIDOCAINE HYDROCHLORIDE 2 ML: 10 INJECTION, SOLUTION INFILTRATION; PERINEURAL at 16:33

## 2022-08-11 NOTE — PROGRESS NOTES
"Chief Complaint  Follow-up and Pain of the Right Shoulder and Follow-up of the Right Elbow    Subjective    History of Present Illness      Lyly Zeng is a 52 y.o. female who presents to [facility] for follow-up on right upper extremity discomfort.  History of Present Illness the patient has been having increasing pain and discomfort in the right shoulder and the right elbow.  She has weakness in abduction.  She has distinct signs of shoulder impingement of the subacromial space.  She also has lateral elbow pain and discomfort.  She states that the previous injection of steroid to the shoulder made her symptoms flared up and she felt worse after the injection.  She has a lot of pain and discomfort posteriorly over the shoulder blade.  She has had a previous trigger point injection to the shoulder with a steroid.  That seemed to have made things a little bit better for her.  She does have an underlying diagnosis of Friedreich's ataxia and that definitely affects her mobility.  She has to use the upper extremity in conjunction with her lower extremity for mobility.  She also has pain with numbness and tingling in the right upper extremity consistent with carpal tunnel syndrome.  Pain Location: RIGHT shoulder and right elbow  Radiation: From the shoulder to the elbow to the wrist.  Quality: dull, aching  Intensity/Severity: moderate to severe  Duration: Several months  Progression of symptoms: no worsening, symptoms stable/unchanged  Onset quality: gradual   Timing: intermittent  Aggravating Factors: rotating motion, repetitive motion  Alleviating Factors: NSAIDs  Previous Episodes: yes  Associated Symptoms: pain, swelling, decreased ROM, decreased strength  ADLs Affected: grooming/hygiene/toileting/personal care  Previous Treatment: NSAIDs       Objective   Vital Signs:   Temp 98.6 °F (37 °C)   Ht 154.9 cm (61\")   Wt 79.8 kg (176 lb)   BMI 33.25 kg/m²     Physical Exam  Physical " Exam  Vitals signs and nursing note reviewed.   Constitutional:       Appearance: Normal appearance.   Pulmonary:      Effort: Pulmonary effort is normal.   Skin:     General: Skin is warm and dry.      Capillary Refill: Capillary refill takes less than 2 seconds.   Neurological:      General: No focal deficit present.      Mental Status: He is alert and oriented to person, place, and time. Mental status is at baseline.   Psychiatric:         Mood and Affect: Mood normal.         Behavior: Behavior normal.         Thought Content: Thought content normal.         Judgment: Judgment normal.     Ortho Exam   Right shoulder (impingement). Patient has signs of impingement with internal and external rotation. There is a lot of pain and tenderness for the patient over the subcromial bursa. Jamil's sign is positive. Neer sign is positive. Forward flexion is 0-110 degrees, abduction is 0-110 degrees, external rotation is 0-30 degrees. Rotator cuff function is fairly well preserved except for the impingement at 90 degrees. Apprehension sign is negative. Axillary nerve function is well preserved. Radial artery pulses are palpable. There is no evidence of multidirectional instability. Sulcus sign is negative. Drop arm sign is negative. The patient has some ill-defined tenderness over the greater tuberosity of the humerus. The pain level is 5.  Right wrist-carpal tunnel. The patient is right hand dominant. The Skin is mildly swollen volarly over the proximal crease of the wrist. Radial pulse is palpable. Capillary refill is 2 seconds with a brisk return. Positive Tinel's sign at the wrist. Positive Phalen's sign at the wrist .  strength is impaired.  There is no muscle wasting or atrophy specifically involving the thenar muscle group.  Sensation to light touch and pinprick are diminished over the thumb, index and middle fingers.  Flexor and extensor tendon functions are well preserved. Moving 2 point discrimination is  prolonged to 12mm over the median nerve distribution. No evidence of RSD.  There is no clinical evidence of renal disease, thyroid disease or any generalized neurological condition that could be causing nerve dysfunction.  Right elbow-lateral epicondylitis. Relationship of 3 bony points is well preserved. There is no evidence of posterior interosseous nerve palsy. Mild effusion is noted of the elbow. There is no ulnar neuritis. Patient has a lot of pain and tenderness over the lateral/medial aspect of the elbow. Range of motion of the elbow is 0- 120 degrees of extension (nml 0), 0-90 degrees of supination (nml 0-90). Extension of the wrist against resistance bothers the patient significantly. Radial artery pulses are palpable. Skin and soft tissues are slightly swollen. There is point tenderness over the flexor pronator muscle mass/extensor supinator muscle mass.        Result Review :   The following data was reviewed by: Emeka Trujillo MD on 08/11/2022:  Radiologic studies - see below for interpretation  RIGHT shoulder xrays  weightbearing/standing ap/lateral views were ordered by Emeka Trujillo MD. Performed at [facility] on 08/11/2022. Images were independently viewed and interpreted by myself, my impression as follows:    right Shoulder X-Ray  Indication: Evaluation of pain and discomfort on the lateral aspect of the shoulder joint.  AP and Lateral  Findings: Slight proximal migration of the humeral head with narrowing of the AC joint space.  no bony lesion  Soft tissues within normal limits  decreased joint spaces  Hardware appropriately positioned not applicable      no prior studies available for comparison.    X-RAY was ordered and reviewed by Emeka Trujillo MD            Large Joint Arthrocentesis  Date/Time: 8/11/2022 4:33 PM  Consent given by: patient  Site marked: site marked  Timeout: Immediately prior to procedure a time out was called to verify the correct patient, procedure,  equipment, support staff and site/side marked as required   Supporting Documentation  Indications: pain   Procedure Details  Location: shoulder -   Preparation: Patient was prepped and draped in the usual sterile fashion  Needle size: 25 G  Approach: anteromedial  Medications administered: 2 mL lidocaine 1 %; 160 mg methylPREDNISolone acetate 80 MG/ML  Patient tolerance: patient tolerated the procedure well with no immediate complications                 Assessment   Assessment and Plan    Diagnoses and all orders for this visit:    1. Impingement syndrome of right shoulder (Primary)    2. Right elbow pain    Other orders  -     Large Joint Arthrocentesis          Follow Up   · Compression/brace to the wrist to minimize the pressure over the transverse carpal ligament.  · Calcium and vitamin D for bone health.  · Injected patient's right shoulder with a steroid over the trigger point on the posterior aspect.  · Following dislocation precautions discussed with the patient.  · She states that a topical analgesic cool blue gel really helps her and we discussed about continued use of a topical analgesic.  · Rest, ice, compression, and elevation (RICE) therapy  · Stretching and strengthening exercises of the flexors and abductors of the shoulder to prevent arthrofibrosis.  · OTC Tylenol 500-1000mg by mouth every 6 hours as needed for pain   · Follow up in 3 month(s)  • Patient was given instructions and counseling regarding her condition or for health maintenance advice. Please see specific information pulled into the AVS if appropriate.     Emeka Trujillo MD   Date of Encounter: 8/11/2022   Electronically signed by Emeka Trujillo MD, 08/11/22, 4:33 PM EDT.     EMR Dragon/Transcription disclaimer:  Much of this encounter note is an electronic transcription/translation of spoken language to printed text. The electronic translation of spoken language may permit erroneous, or at times, nonsensical words or phrases to be  inadvertently transcribed; Although I have reviewed the note for such errors, some may still exist.

## 2022-08-25 PROBLEM — M75.41 IMPINGEMENT SYNDROME OF RIGHT SHOULDER: Status: ACTIVE | Noted: 2022-08-25

## 2022-08-25 PROBLEM — M25.521 RIGHT ELBOW PAIN: Status: ACTIVE | Noted: 2022-08-25

## 2022-08-25 RX ORDER — METHYLPREDNISOLONE ACETATE 80 MG/ML
160 INJECTION, SUSPENSION INTRA-ARTICULAR; INTRALESIONAL; INTRAMUSCULAR; SOFT TISSUE
Status: COMPLETED | OUTPATIENT
Start: 2022-08-11 | End: 2022-08-11

## 2022-08-25 RX ORDER — LIDOCAINE HYDROCHLORIDE 10 MG/ML
2 INJECTION, SOLUTION INFILTRATION; PERINEURAL
Status: COMPLETED | OUTPATIENT
Start: 2022-08-11 | End: 2022-08-11

## 2022-08-29 ENCOUNTER — HOSPITAL ENCOUNTER (OUTPATIENT)
Dept: CT IMAGING | Facility: HOSPITAL | Age: 52
End: 2022-08-29

## 2022-09-07 ENCOUNTER — APPOINTMENT (OUTPATIENT)
Dept: CT IMAGING | Facility: HOSPITAL | Age: 52
End: 2022-09-07

## 2022-09-14 ENCOUNTER — HOSPITAL ENCOUNTER (OUTPATIENT)
Dept: CT IMAGING | Facility: HOSPITAL | Age: 52
Discharge: HOME | End: 2022-09-14
Admitting: OTOLARYNGOLOGY

## 2022-09-14 DIAGNOSIS — J32.9 CHRONIC SINUSITIS, UNSPECIFIED LOCATION: ICD-10-CM

## 2022-09-14 PROCEDURE — 70486 CT MAXILLOFACIAL W/O DYE: CPT

## 2022-10-31 ENCOUNTER — HOSPITAL ENCOUNTER (OUTPATIENT)
Dept: GENERAL RADIOLOGY | Facility: HOSPITAL | Age: 52
Discharge: HOME | End: 2022-10-31
Admitting: ORTHOPAEDIC SURGERY

## 2022-10-31 ENCOUNTER — OFFICE VISIT (OUTPATIENT)
Dept: ORTHOPEDIC SURGERY | Facility: CLINIC | Age: 52
End: 2022-10-31

## 2022-10-31 VITALS — WEIGHT: 172 LBS | HEIGHT: 60 IN | BODY MASS INDEX: 33.77 KG/M2 | TEMPERATURE: 97.6 F

## 2022-10-31 DIAGNOSIS — G89.29 CHRONIC LEFT SHOULDER PAIN: Primary | ICD-10-CM

## 2022-10-31 DIAGNOSIS — M25.511 CHRONIC RIGHT SHOULDER PAIN: ICD-10-CM

## 2022-10-31 DIAGNOSIS — G89.29 CHRONIC RIGHT SHOULDER PAIN: ICD-10-CM

## 2022-10-31 DIAGNOSIS — M25.512 CHRONIC LEFT SHOULDER PAIN: Primary | ICD-10-CM

## 2022-10-31 DIAGNOSIS — G89.29 CHRONIC LEFT SHOULDER PAIN: ICD-10-CM

## 2022-10-31 DIAGNOSIS — M25.512 CHRONIC LEFT SHOULDER PAIN: ICD-10-CM

## 2022-10-31 PROCEDURE — 99213 OFFICE O/P EST LOW 20 MIN: CPT | Performed by: ORTHOPAEDIC SURGERY

## 2022-10-31 PROCEDURE — 73030 X-RAY EXAM OF SHOULDER: CPT

## 2022-10-31 PROCEDURE — 20610 DRAIN/INJ JOINT/BURSA W/O US: CPT | Performed by: ORTHOPAEDIC SURGERY

## 2022-10-31 RX ADMIN — METHYLPREDNISOLONE ACETATE 80 MG: 40 INJECTION, SUSPENSION INTRA-ARTICULAR; INTRALESIONAL; INTRAMUSCULAR; SOFT TISSUE at 15:06

## 2022-10-31 RX ADMIN — LIDOCAINE HYDROCHLORIDE 2 ML: 20 INJECTION, SOLUTION EPIDURAL; INFILTRATION; INTRACAUDAL; PERINEURAL at 15:06

## 2022-11-13 PROBLEM — M25.512 CHRONIC LEFT SHOULDER PAIN: Status: ACTIVE | Noted: 2022-11-13

## 2022-11-13 PROBLEM — G89.29 CHRONIC LEFT SHOULDER PAIN: Status: ACTIVE | Noted: 2022-11-13

## 2022-11-13 PROBLEM — G89.29 CHRONIC RIGHT SHOULDER PAIN: Status: ACTIVE | Noted: 2022-11-13

## 2022-11-13 PROBLEM — M25.511 CHRONIC RIGHT SHOULDER PAIN: Status: ACTIVE | Noted: 2022-11-13

## 2022-11-13 RX ORDER — METHYLPREDNISOLONE ACETATE 40 MG/ML
80 INJECTION, SUSPENSION INTRA-ARTICULAR; INTRALESIONAL; INTRAMUSCULAR; SOFT TISSUE
Status: COMPLETED | OUTPATIENT
Start: 2022-10-31 | End: 2022-10-31

## 2022-11-13 RX ORDER — LIDOCAINE HYDROCHLORIDE 20 MG/ML
2 INJECTION, SOLUTION EPIDURAL; INFILTRATION; INTRACAUDAL; PERINEURAL
Status: COMPLETED | OUTPATIENT
Start: 2022-10-31 | End: 2022-10-31

## 2022-12-01 ENCOUNTER — TRANSCRIBE ORDERS (OUTPATIENT)
Dept: ADMINISTRATIVE | Facility: HOSPITAL | Age: 52
End: 2022-12-01

## 2022-12-01 DIAGNOSIS — M54.2 NECK PAIN: ICD-10-CM

## 2022-12-01 DIAGNOSIS — M54.50 LUMBAR PAIN: ICD-10-CM
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
## 2022-12-01 DIAGNOSIS — R51.9 NONINTRACTABLE HEADACHE, UNSPECIFIED CHRONICITY PATTERN, UNSPECIFIED HEADACHE TYPE: ICD-10-CM

## 2022-12-01 DIAGNOSIS — M25.551 HIP PAIN, RIGHT: ICD-10-CM

## 2023-04-27 ENCOUNTER — OFFICE VISIT (OUTPATIENT)
Dept: ORTHOPEDIC SURGERY | Facility: CLINIC | Age: 53
End: 2023-04-27
Payer: MEDICARE

## 2023-04-27 VITALS — WEIGHT: 172 LBS | TEMPERATURE: 97.2 F | BODY MASS INDEX: 32.47 KG/M2 | HEIGHT: 61 IN

## 2023-04-27 DIAGNOSIS — M75.41 IMPINGEMENT SYNDROME OF RIGHT SHOULDER: Primary | ICD-10-CM

## 2023-04-27 PROBLEM — E66.811 OBESITY (BMI 30.0-34.9): Status: ACTIVE | Noted: 2023-04-27

## 2023-04-27 PROBLEM — E66.9 OBESITY (BMI 30.0-34.9): Status: ACTIVE | Noted: 2023-04-27

## 2023-04-27 RX ORDER — LIDOCAINE HYDROCHLORIDE 10 MG/ML
2 INJECTION, SOLUTION EPIDURAL; INFILTRATION; INTRACAUDAL; PERINEURAL
Status: COMPLETED | OUTPATIENT
Start: 2023-04-27 | End: 2023-04-27

## 2023-04-27 RX ORDER — METHYLPREDNISOLONE ACETATE 80 MG/ML
160 INJECTION, SUSPENSION INTRA-ARTICULAR; INTRALESIONAL; INTRAMUSCULAR; SOFT TISSUE
Status: COMPLETED | OUTPATIENT
Start: 2023-04-27 | End: 2023-04-27

## 2023-04-27 RX ADMIN — METHYLPREDNISOLONE ACETATE 160 MG: 80 INJECTION, SUSPENSION INTRA-ARTICULAR; INTRALESIONAL; INTRAMUSCULAR; SOFT TISSUE at 10:24

## 2023-04-27 RX ADMIN — LIDOCAINE HYDROCHLORIDE 2 ML: 10 INJECTION, SOLUTION EPIDURAL; INFILTRATION; INTRACAUDAL; PERINEURAL at 10:24

## 2023-04-27 NOTE — PROGRESS NOTES
"Chief Complaint  Follow-up of the Left Shoulder and Follow-up of the Right Shoulder    Subjective    History of Present Illness      Lyly Zeng is a 52 y.o. female who presents to [facility] for  Patient returns today for follow-up on right shoulder pain. Her pain is generalized in the shoulder, has been progressive and remains intermittent . Her pain is worsened by overhead reaching, reaching backward and improved with injections.      Objective   Vital Signs:   Temp 97.2 °F (36.2 °C)   Ht 154.9 cm (61\")   Wt 78 kg (172 lb)   BMI 32.50 kg/m²     Physical Exam  52 y.o. female is awake, alert, oriented, in no acute distress and well developed, well nourished.  Ortho Exam   RIGHT shoulder  Right shoulder (impingement). Patient has signs of impingement with internal and external rotation. There is a lot of pain and tenderness for the patient over the subcromial bursa. Jamil's sign is positive. Neer sign is positive. Forward flexion is 0-110 degrees, abduction is 0-110 degrees, external rotation is 0-30 degrees. Rotator cuff function is fairly well preserved except for the impingement at 90 degrees. Apprehension sign is negative. Axillary nerve function is well preserved. Radial artery pulses are palpable. There is no evidence of multidirectional instability. Sulcus sign is negative. Drop arm sign is negative. The patient has some ill-defined tenderness over the greater tuberosity of the humerus. The pain level is 5.      Result Review :                  Large Joint Arthrocentesis: R knee  Date/Time: 4/27/2023 10:24 AM  Consent given by: patient  Site marked: site marked  Timeout: Immediately prior to procedure a time out was called to verify the correct patient, procedure, equipment, support staff and site/side marked as required   Supporting Documentation  Indications: pain   Procedure Details  Location: knee - R knee  Preparation: Patient was prepped and draped in the usual " sterile fashion  Needle size: 25 G  Medications administered: 160 mg methylPREDNISolone acetate 80 MG/ML; 2 mL lidocaine PF 1% 1 %  Patient tolerance: patient tolerated the procedure well with no immediate complications               Assessment   Assessment and Plan    Problem List Items Addressed This Visit        Musculoskeletal and Injuries    Impingement syndrome of right shoulder - Primary    Relevant Orders    Large Joint Arthrocentesis: R knee       Follow Up   · Injected patient's right shoulder joint(s)with Depo-Medrol from an anterolateral approach   · Compression/brace   · Rest, ice, compression, and elevation (RICE) therapy  · OTC Tylenol 500-1000mg by mouth every 6 hours as needed for pain   · Follow up in 3-6 month(s)  • Patient was given instructions and counseling regarding her condition or for health maintenance advice. Please see specific information pulled into the AVS if appropriate.     Emeka Trujillo MD   Date of Encounter: 4/27/2023   Electronically signed by Emeka Trujillo MD, 04/27/23, 10:18 AM EDT.     EMR Dragon/Transcription disclaimer:  Much of this encounter note is an electronic transcription/translation of spoken language to printed text. The electronic translation of spoken language may permit erroneous, or at times, nonsensical words or phrases to be inadvertently transcribed; Although I have reviewed the note for such errors, some may still exist.

## 2023-07-25 ENCOUNTER — OFFICE (AMBULATORY)
Dept: URBAN - METROPOLITAN AREA CLINIC 76 | Facility: CLINIC | Age: 53
End: 2023-07-25

## 2023-07-25 VITALS
WEIGHT: 181 LBS | OXYGEN SATURATION: 96 % | HEIGHT: 60 IN | DIASTOLIC BLOOD PRESSURE: 71 MMHG | SYSTOLIC BLOOD PRESSURE: 114 MMHG | HEART RATE: 83 BPM

## 2023-07-25 DIAGNOSIS — Z86.010 PERSONAL HISTORY OF COLONIC POLYPS: ICD-10-CM

## 2023-07-25 DIAGNOSIS — K57.30 DIVERTICULOSIS OF LARGE INTESTINE WITHOUT PERFORATION OR ABS: ICD-10-CM

## 2023-07-25 DIAGNOSIS — R15.0 INCOMPLETE DEFECATION: ICD-10-CM

## 2023-07-25 DIAGNOSIS — K59.00 CONSTIPATION, UNSPECIFIED: ICD-10-CM

## 2023-07-25 DIAGNOSIS — R13.10 DYSPHAGIA, UNSPECIFIED: ICD-10-CM

## 2023-07-25 DIAGNOSIS — R12 HEARTBURN: ICD-10-CM

## 2023-07-25 PROBLEM — D12.4 BENIGN NEOPLASM OF DESCENDING COLON: Status: INACTIVE | Noted: 2018-04-09

## 2023-07-25 PROBLEM — K62.1 RECTAL POLYP: Status: INACTIVE | Noted: 2018-04-09

## 2023-07-25 PROBLEM — K31.89 OTHER DISEASES OF STOMACH AND DUODENUM: Status: INACTIVE | Noted: 2018-04-09

## 2023-07-25 PROBLEM — D12.5 BENIGN NEOPLASM OF SIGMOID COLON: Status: INACTIVE | Noted: 2018-04-09

## 2023-07-25 PROCEDURE — 99204 OFFICE O/P NEW MOD 45 MIN: CPT | Performed by: INTERNAL MEDICINE

## 2023-07-25 RX ORDER — PANTOPRAZOLE 40 MG/1
40 TABLET, DELAYED RELEASE ORAL
Qty: 60 | Refills: 5 | Status: ACTIVE
Start: 2023-07-25

## 2023-07-25 RX ORDER — OMEPRAZOLE 40 MG/1
40 CAPSULE, DELAYED RELEASE ORAL
Qty: 30 | Refills: 0 | Status: COMPLETED
End: 2023-07-25

## 2023-08-02 ENCOUNTER — HOSPITAL ENCOUNTER (OUTPATIENT)
Dept: GENERAL RADIOLOGY | Facility: HOSPITAL | Age: 53
Discharge: HOME | End: 2023-08-02
Admitting: PHYSICIAN ASSISTANT
Payer: MEDICARE

## 2023-08-02 ENCOUNTER — CLINICAL SUPPORT (OUTPATIENT)
Dept: ORTHOPEDIC SURGERY | Facility: CLINIC | Age: 53
End: 2023-08-02
Payer: MEDICARE

## 2023-08-02 VITALS — TEMPERATURE: 97.7 F | BODY MASS INDEX: 34.17 KG/M2 | HEIGHT: 61 IN | WEIGHT: 181 LBS

## 2023-08-02 DIAGNOSIS — M79.672 LEFT FOOT PAIN: Primary | ICD-10-CM

## 2023-08-02 DIAGNOSIS — M79.672 LEFT FOOT PAIN: ICD-10-CM

## 2023-08-02 DIAGNOSIS — M75.41 IMPINGEMENT SYNDROME OF RIGHT SHOULDER: ICD-10-CM

## 2023-08-02 PROCEDURE — 73630 X-RAY EXAM OF FOOT: CPT

## 2023-08-02 RX ADMIN — LIDOCAINE HYDROCHLORIDE 2 ML: 20 INJECTION, SOLUTION EPIDURAL; INFILTRATION; INTRACAUDAL; PERINEURAL at 13:20

## 2023-08-02 RX ADMIN — METHYLPREDNISOLONE ACETATE 160 MG: 80 INJECTION, SUSPENSION INTRA-ARTICULAR; INTRALESIONAL; INTRAMUSCULAR; SOFT TISSUE at 13:20

## 2023-08-02 NOTE — PROGRESS NOTES
"Chief Complaint  Follow-up and Pain of the Right Shoulder    Subjective    History of Present Illness      Lyly Zeng is a 53 y.o. female who presents to [facility] for injection therapy. She receives RIGHT shoulder injections of Depo-Medrol. Since last injection, patient notes substantial relief of symptoms. The effects of the injections last about 3 months. Treatment options have been discussed and she understands and consents.     She also reports pain in the top of the left foot secondary to a fall 3 months ago. She reports the pain has improved since onset, but continues to give her some issues. She reports pain at the 3rd-5th metatarsals, along with bruising.         Objective   Vital Signs:   Temp 97.7 øF (36.5 øC)   Ht 154.9 cm (61\")   Wt 82.1 kg (181 lb)   BMI 34.20 kg/mý     Physical Exam  53 y.o. female is awake, alert, oriented, in no acute distress and well developed, well nourished.  Ortho Exam   RIGHT shoulder  Positive for pain and tenderness with palpation over the subcromial bursa.   Positive for signs of impingement with internal and external rotation.   Forward flexion is 0- 120 degrees, abduction is 0-120 degrees, external rotation is 0-45 degrees.   Rotator cuff function is fairly well preserved except impingement at 90 degrees.   Jamil's sign is positive. Neer sign is positive.   Sulcus sign is negative. Drop arm sign is negative.   Apprehension sign is negative.   Axillary nerve function is well preserved. Radial artery pulses are palpable.   There is no evidence of multidirectional instability.       LEFT foot   Distal pulses are palpable.  There is old bruising present over the 3rd-5th metatarsals.   Positive for tenderness with palpation over the 3rd through 5th metatarsals at the dorsal aspect of the foot.       Result Review :   Radiologic studies - see below for interpretation  LEFT foot xrays   3 views were ordered by Jovanny Boothe PA-C. " Performed at [facility] on 8/2/2023. Images were independently viewed and interpreted by myself, my impression as follows:  Findings: evidence of possible prior fracture of the 5th metatarsal, no acute fracture noted  Bony lesion: no  Soft tissues: within normal limits  Joint spaces: within normal limits  Hardware appropriately positioned: not applicable  Prior studies available for comparison: no          PROCEDURE  Large Joint Arthrocentesis: R glenohumeral  Date/Time: 8/2/2023 1:20 PM  Consent given by: patient  Site marked: site marked  Timeout: Immediately prior to procedure a time out was called to verify the correct patient, procedure, equipment, support staff and site/side marked as required   Supporting Documentation  Indications: pain   Procedure Details  Location: shoulder - R glenohumeral  Preparation: Patient was prepped and draped in the usual sterile fashion  Needle size: 25 G  Medications administered: 160 mg methylPREDNISolone acetate 80 MG/ML; 2 mL lidocaine PF 2% 2 %  Patient tolerance: patient tolerated the procedure well with no immediate complications           Injection site was identified by physical examination and cleaned with Betadine and alcohol swabs. Prior to needle insertion, ethyl chloride spray was used for surface anesthesia. Sterile technique was used.       Assessment   Assessment and Plan    Problem List Items Addressed This Visit          Musculoskeletal and Injuries    Impingement syndrome of right shoulder    Relevant Orders    Large Joint Arthrocentesis: R glenohumeral     Other Visit Diagnoses       Left foot pain    -  Primary    Relevant Orders    XR Foot 3+ View Left (Completed)            PLAN   Called to notify of negative foot xrays.  Right shoulder Depo-Medrol injection was discussed with the patient. Discussed indication, risks, benefits, and alternatives. Verbal consent was given to proceed with the procedure.   Injection was performed from  anteromedial approach.  Patient tolerated the procedure well and no complications were encountered.  Discussion of orthopedic goals and activities and patient/guardian expressed understanding.  Ice, heat, rest, compression and elevation of extremity as beneficial  nsaids and/or tylenol as beneficial  Instructed to refrain from heavy activity/rest the extremity for the next 24-48 hours  Discussion regarding possibility of cortisol flare and what to expect if this occurs  Watch for signs and symptoms of infection  Call if adverse effect from injection therapy  Follow up in 3 months  Patient was given instructions and counseling regarding her condition or for health maintenance advice. Please see specific information pulled into the AVS if appropriate.     Jovanny Boothe PA-C   Date of Encounter: 8/2/2023   Electronically signed by Jovanny Boothe PA-C, 08/13/23, 5:10 PM EDT.     EMR Dragon/Transcription disclaimer:  Much of this encounter note is an electronic transcription/translation of spoken language to printed text. The electronic translation of spoken language may permit erroneous, or at times, nonsensical words or phrases to be inadvertently transcribed; Although I have reviewed the note for such errors, some may still exist.

## 2023-08-04 ENCOUNTER — TELEPHONE (OUTPATIENT)
Dept: ORTHOPEDIC SURGERY | Facility: CLINIC | Age: 53
End: 2023-08-04
Payer: MEDICARE

## 2023-08-13 RX ORDER — LIDOCAINE HYDROCHLORIDE 20 MG/ML
2 INJECTION, SOLUTION EPIDURAL; INFILTRATION; INTRACAUDAL; PERINEURAL
Status: COMPLETED | OUTPATIENT
Start: 2023-08-02 | End: 2023-08-02

## 2023-08-13 RX ORDER — METHYLPREDNISOLONE ACETATE 80 MG/ML
160 INJECTION, SUSPENSION INTRA-ARTICULAR; INTRALESIONAL; INTRAMUSCULAR; SOFT TISSUE
Status: COMPLETED | OUTPATIENT
Start: 2023-08-02 | End: 2023-08-02

## 2023-08-24 VITALS
HEART RATE: 86 BPM | SYSTOLIC BLOOD PRESSURE: 101 MMHG | DIASTOLIC BLOOD PRESSURE: 89 MMHG | HEART RATE: 70 BPM | RESPIRATION RATE: 15 BRPM | DIASTOLIC BLOOD PRESSURE: 88 MMHG | SYSTOLIC BLOOD PRESSURE: 115 MMHG | SYSTOLIC BLOOD PRESSURE: 109 MMHG | DIASTOLIC BLOOD PRESSURE: 78 MMHG | SYSTOLIC BLOOD PRESSURE: 108 MMHG | HEART RATE: 83 BPM | DIASTOLIC BLOOD PRESSURE: 56 MMHG | DIASTOLIC BLOOD PRESSURE: 90 MMHG | RESPIRATION RATE: 18 BRPM | DIASTOLIC BLOOD PRESSURE: 60 MMHG | WEIGHT: 181 LBS | HEART RATE: 78 BPM | OXYGEN SATURATION: 96 % | RESPIRATION RATE: 12 BRPM | OXYGEN SATURATION: 100 % | OXYGEN SATURATION: 94 % | RESPIRATION RATE: 14 BRPM | DIASTOLIC BLOOD PRESSURE: 75 MMHG | SYSTOLIC BLOOD PRESSURE: 125 MMHG | DIASTOLIC BLOOD PRESSURE: 63 MMHG | DIASTOLIC BLOOD PRESSURE: 65 MMHG | OXYGEN SATURATION: 92 % | HEART RATE: 63 BPM | SYSTOLIC BLOOD PRESSURE: 97 MMHG | RESPIRATION RATE: 10 BRPM | OXYGEN SATURATION: 97 % | HEART RATE: 81 BPM | OXYGEN SATURATION: 98 % | HEART RATE: 65 BPM | SYSTOLIC BLOOD PRESSURE: 117 MMHG | HEIGHT: 60 IN | RESPIRATION RATE: 16 BRPM | HEART RATE: 85 BPM | HEART RATE: 82 BPM | OXYGEN SATURATION: 99 % | DIASTOLIC BLOOD PRESSURE: 53 MMHG | SYSTOLIC BLOOD PRESSURE: 122 MMHG | TEMPERATURE: 97.3 F | RESPIRATION RATE: 20 BRPM

## 2023-08-30 ENCOUNTER — AMBULATORY SURGICAL CENTER (AMBULATORY)
Dept: URBAN - METROPOLITAN AREA SURGERY 17 | Facility: SURGERY | Age: 53
End: 2023-08-30
Payer: COMMERCIAL

## 2023-08-30 ENCOUNTER — OFFICE (AMBULATORY)
Dept: URBAN - METROPOLITAN AREA PATHOLOGY 4 | Facility: PATHOLOGY | Age: 53
End: 2023-08-30

## 2023-08-30 DIAGNOSIS — K21.00 GASTRO-ESOPHAGEAL REFLUX DISEASE WITH ESOPHAGITIS, WITHOUT B: ICD-10-CM

## 2023-08-30 DIAGNOSIS — R13.10 DYSPHAGIA, UNSPECIFIED: ICD-10-CM

## 2023-08-30 DIAGNOSIS — Z86.010 PERSONAL HISTORY OF COLONIC POLYPS: ICD-10-CM

## 2023-08-30 DIAGNOSIS — R12 HEARTBURN: ICD-10-CM

## 2023-08-30 DIAGNOSIS — K57.30 DIVERTICULOSIS OF LARGE INTESTINE WITHOUT PERFORATION OR ABS: ICD-10-CM

## 2023-08-30 PROBLEM — K22.89 OTHER SPECIFIED DISEASE OF ESOPHAGUS: Status: ACTIVE | Noted: 2023-08-30

## 2023-08-30 LAB
GI HISTOLOGY: A. UNSPECIFIED: (no result)
GI HISTOLOGY: PDF REPORT: (no result)

## 2023-08-30 PROCEDURE — 88305 TISSUE EXAM BY PATHOLOGIST: CPT | Performed by: INTERNAL MEDICINE

## 2023-08-30 PROCEDURE — 43239 EGD BIOPSY SINGLE/MULTIPLE: CPT | Performed by: INTERNAL MEDICINE

## 2023-08-30 PROCEDURE — 43450 DILATE ESOPHAGUS 1/MULT PASS: CPT | Performed by: INTERNAL MEDICINE

## 2023-08-30 PROCEDURE — G0105 COLORECTAL SCRN; HI RISK IND: HCPCS | Performed by: INTERNAL MEDICINE

## 2023-09-26 ENCOUNTER — TRANSCRIBE ORDERS (OUTPATIENT)
Dept: ADMINISTRATIVE | Facility: HOSPITAL | Age: 53
End: 2023-09-26
Payer: MEDICARE

## 2023-09-26 DIAGNOSIS — M47.812 CERVICAL SPONDYLOSIS: Primary | ICD-10-CM

## 2023-10-20 ENCOUNTER — TRANSCRIBE ORDERS (OUTPATIENT)
Dept: ADMINISTRATIVE | Facility: HOSPITAL | Age: 53
End: 2023-10-20
Payer: MEDICARE

## 2023-10-20 ENCOUNTER — HOSPITAL ENCOUNTER (OUTPATIENT)
Dept: GENERAL RADIOLOGY | Facility: HOSPITAL | Age: 53
Discharge: HOME | End: 2023-10-20
Admitting: NURSE PRACTITIONER
Payer: MEDICARE

## 2023-10-20 DIAGNOSIS — R05.1 ACUTE COUGH: ICD-10-CM

## 2023-10-20 DIAGNOSIS — R05.1 ACUTE COUGH: Primary | ICD-10-CM

## 2023-10-20 PROCEDURE — 71046 X-RAY EXAM CHEST 2 VIEWS: CPT

## 2023-10-24 ENCOUNTER — OFFICE (AMBULATORY)
Dept: URBAN - METROPOLITAN AREA CLINIC 76 | Facility: CLINIC | Age: 53
End: 2023-10-24
Payer: MEDICAID

## 2023-10-24 VITALS
SYSTOLIC BLOOD PRESSURE: 111 MMHG | HEIGHT: 60 IN | DIASTOLIC BLOOD PRESSURE: 65 MMHG | WEIGHT: 188 LBS | HEART RATE: 79 BPM | OXYGEN SATURATION: 94 %

## 2023-10-24 DIAGNOSIS — K59.00 CONSTIPATION, UNSPECIFIED: ICD-10-CM

## 2023-10-24 DIAGNOSIS — K57.30 DIVERTICULOSIS OF LARGE INTESTINE WITHOUT PERFORATION OR ABS: ICD-10-CM

## 2023-10-24 DIAGNOSIS — R13.10 DYSPHAGIA, UNSPECIFIED: ICD-10-CM

## 2023-10-24 DIAGNOSIS — Z86.010 PERSONAL HISTORY OF COLONIC POLYPS: ICD-10-CM

## 2023-10-24 DIAGNOSIS — K21.9 GASTRO-ESOPHAGEAL REFLUX DISEASE WITHOUT ESOPHAGITIS: ICD-10-CM

## 2023-10-24 PROCEDURE — 99214 OFFICE O/P EST MOD 30 MIN: CPT | Performed by: NURSE PRACTITIONER

## 2023-10-24 RX ORDER — PANTOPRAZOLE 40 MG/1
40 TABLET, DELAYED RELEASE ORAL
Qty: 60 | Refills: 5 | Status: ACTIVE
Start: 2023-07-25

## 2023-11-08 ENCOUNTER — HOSPITAL ENCOUNTER (OUTPATIENT)
Dept: MRI IMAGING | Facility: HOSPITAL | Age: 53
Discharge: HOME | End: 2023-11-08
Admitting: NURSE PRACTITIONER
Payer: MEDICARE

## 2023-11-08 DIAGNOSIS — M47.812 CERVICAL SPONDYLOSIS: ICD-10-CM

## 2023-11-08 PROCEDURE — 72141 MRI NECK SPINE W/O DYE: CPT

## 2024-03-25 ENCOUNTER — OFFICE VISIT (OUTPATIENT)
Dept: PULMONOLOGY | Facility: CLINIC | Age: 54
End: 2024-03-25
Payer: MEDICARE

## 2024-03-25 VITALS
WEIGHT: 177 LBS | OXYGEN SATURATION: 96 % | TEMPERATURE: 98.3 F | RESPIRATION RATE: 16 BRPM | HEIGHT: 61 IN | DIASTOLIC BLOOD PRESSURE: 82 MMHG | SYSTOLIC BLOOD PRESSURE: 138 MMHG | BODY MASS INDEX: 33.42 KG/M2 | HEART RATE: 74 BPM

## 2024-03-25 DIAGNOSIS — G11.11 FRIEDREICH'S ATAXIA: ICD-10-CM

## 2024-03-25 DIAGNOSIS — J41.8 MIXED SIMPLE AND MUCOPURULENT CHRONIC BRONCHITIS: Primary | ICD-10-CM

## 2024-03-25 DIAGNOSIS — F17.201 TOBACCO ABUSE, IN REMISSION: ICD-10-CM

## 2024-03-25 PROCEDURE — 1159F MED LIST DOCD IN RCRD: CPT | Performed by: INTERNAL MEDICINE

## 2024-03-25 PROCEDURE — 99203 OFFICE O/P NEW LOW 30 MIN: CPT | Performed by: INTERNAL MEDICINE

## 2024-03-25 PROCEDURE — 1160F RVW MEDS BY RX/DR IN RCRD: CPT | Performed by: INTERNAL MEDICINE

## 2024-03-25 RX ORDER — FLUOXETINE HYDROCHLORIDE 20 MG/1
20 CAPSULE ORAL DAILY
COMMUNITY

## 2024-03-25 RX ORDER — DILTIAZEM HYDROCHLORIDE 60 MG/1
2 TABLET, FILM COATED ORAL
COMMUNITY
Start: 2023-12-30

## 2024-03-25 RX ORDER — ALBUTEROL SULFATE 2.5 MG/3ML
2.5 SOLUTION RESPIRATORY (INHALATION) EVERY 4 HOURS PRN
COMMUNITY

## 2024-03-25 RX ORDER — BISACODYL 5 MG/1
5 TABLET, DELAYED RELEASE ORAL DAILY PRN
COMMUNITY

## 2024-03-25 RX ORDER — NYSTATIN 100000 U/G
1 CREAM TOPICAL AS NEEDED
COMMUNITY
Start: 2024-03-05

## 2024-03-25 RX ORDER — PAROXETINE 10 MG/1
10 TABLET, FILM COATED ORAL EVERY MORNING
COMMUNITY

## 2024-03-25 RX ORDER — OSELTAMIVIR PHOSPHATE 75 MG/1
75 CAPSULE ORAL DAILY
COMMUNITY
Start: 2023-12-19

## 2024-03-25 RX ORDER — BENZONATATE 200 MG/1
200 CAPSULE ORAL
COMMUNITY

## 2024-03-25 RX ORDER — BUDESONIDE, GLYCOPYRROLATE, AND FORMOTEROL FUMARATE 160; 9; 4.8 UG/1; UG/1; UG/1
2 AEROSOL, METERED RESPIRATORY (INHALATION) 2 TIMES DAILY
COMMUNITY
Start: 2023-09-06

## 2024-03-25 RX ORDER — TRAZODONE HYDROCHLORIDE 50 MG/1
50 TABLET ORAL NIGHTLY
COMMUNITY

## 2024-03-25 NOTE — PROGRESS NOTES
Pulmonary Consultation    Bianca Garrett AP*,    Thank you for asking me to see Lyly Zeng for   Chief Complaint   Patient presents with    COPD    TB Test    Cough     skeletal muscular disease   .      History of Present Illness  Lyly Zeng is a 53 y.o. 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  Review of Systems   Respiratory:  Positive for cough, shortness of breath and wheezing.    All other systems reviewed and are negative.    As described in the HPI. Otherwise, remainder of ROS (14 systems) were negative.    Patient Active Problem List   Diagnosis    Dysthymic disorder (Psychiatry)     Other emphysema    GERD without esophagitis    Friedreich's ataxia (Neurology)    Nicotine vapor product user    Elevated cholesterol    Other allergic rhinitis    Impingement syndrome of right shoulder    Right elbow pain    Chronic right shoulder pain    Chronic left shoulder pain    Obesity (BMI 30.0-34.9)         Current Outpatient Medications:     albuterol (PROVENTIL HFA;VENTOLIN HFA) 108 (90 BASE) MCG/ACT inhaler, Inhale 2 puffs 4 (Four) Times a Day. Indications: Asthma, Disp: , Rfl:     albuterol (PROVENTIL) (5 MG/ML) 0.5% nebulizer solution, Take 0.5 mL by nebulization Every 6 (Six) Hours As Needed for Wheezing., Disp: , Rfl:     ASHWAGANDHA PO, Take  by mouth., Disp: , Rfl:     Calcium Carb-Cholecalciferol (CALCIUM 600 + D PO), Take  by mouth., Disp: , Rfl:     clonazePAM (KlonoPIN) 0.5 MG tablet, Take 1 tablet by mouth 3 (Three) Times a  Day. (Patient taking differently: Take 1 tablet by mouth 2 (Two) Times a Day As Needed. Indications: anx), Disp: 90 tablet, Rfl: 1    gabapentin (NEURONTIN) 400 MG capsule, Take 1 capsule by mouth Daily., Disp: , Rfl:     ibuprofen (ADVIL,MOTRIN) 200 MG tablet, Take 1 tablet by mouth Every 6 (Six) Hours As Needed for Mild Pain., Disp: , Rfl:     ipratropium (ATROVENT) 0.06 % nasal spray, 2 sprays into the nostril(s) as directed by provider 4 (Four) Times a Day., Disp: , Rfl:     loratadine (CLARITIN) 10 MG tablet, Take 1 tablet by mouth Daily., Disp: , Rfl:     nystatin (MYCOSTATIN) 561417 UNIT/GM cream, Apply 1 Application topically to the appropriate area as directed As Needed., Disp: , Rfl:     Probiotic Product (PROBIOTIC PO), Take  by mouth., Disp: , Rfl:     Symbicort 80-4.5 MCG/ACT inhaler, Inhale 2 puffs 2 (Two) Times a Day., Disp: , Rfl:     TURMERIC PO, Take  by mouth., Disp: , Rfl:     vitamin B-12 (CYANOCOBALAMIN) 1000 MCG tablet, Take 1 tablet by mouth Daily., Disp: , Rfl:     albuterol (PROVENTIL) (2.5 MG/3ML) 0.083% nebulizer solution, Take 2.5 mg by nebulization Every 4 (Four) Hours As Needed for Wheezing. (Patient not taking: Reported on 3/25/2024), Disp: , Rfl:     benzonatate (TESSALON) 200 MG capsule, Take 1 capsule by mouth. (Patient not taking: Reported on 3/25/2024), Disp: , Rfl:     bisacodyl (DULCOLAX) 5 MG EC tablet, Take 1 tablet by mouth Daily As Needed. (Patient not taking: Reported on 3/25/2024), Disp: , Rfl:     Budeson-Glycopyrrol-Formoterol (Breztri Aerosphere) 160-9-4.8 MCG/ACT aerosol inhaler, Inhale 2 puffs 2 (Two) Times a Day. (Patient not taking: Reported on 3/25/2024), Disp: , Rfl:     FLUoxetine (PROzac) 20 MG capsule, Take 1 capsule by mouth Daily. (Patient not taking: Reported on 3/25/2024), Disp: , Rfl:     oseltamivir (TAMIFLU) 75 MG capsule, Take 1 capsule by mouth Daily. (Patient not taking: Reported on 3/25/2024), Disp: , Rfl:     PARoxetine (PAXIL) 10 MG tablet, Take 1  tablet by mouth Every Morning. (Patient not taking: Reported on 3/25/2024), Disp: , Rfl:     traZODone (DESYREL) 50 MG tablet, Take 1 tablet by mouth Every Night. (Patient not taking: Reported on 3/25/2024), Disp: , Rfl:     Allergies   Allergen Reactions    Ampicillin Hives     Unknown//cant remember    Aspirin GI Bleeding     Makes her stomach bleed    Oxcarbazepine Itching       Past Medical History:   Diagnosis Date    Arthritis     Asthma     Ataxia     DOES NOT HAVE GOOD BALANCE    COPD (chronic obstructive pulmonary disease)     Depression     GERD (gastroesophageal reflux disease)     Learning disability     Panic attacks      Past Surgical History:   Procedure Laterality Date    COLONOSCOPY      POLYPS    EYE MUSCLE SURGERY Right 2016    Procedure: RIGHT EYE MEDIAL RECTUS RECESSION;  Surgeon: John Kirkpatrick MD;  [place] [facility] [place] [facility] [place]:     LAPAROSCOPIC TUBAL LIGATION Bilateral     OVARIAN CYST REMOVAL Bilateral     TONSILLECTOMY       Social History     Socioeconomic History    Marital status:    Tobacco Use    Smoking status: Former     Current packs/day: 0.00     Average packs/day: 1 pack/day for 28.0 years (28.0 ttl pk-yrs)     Types: Cigarettes     Start date:      Quit date:      Years since quittin.2    Smokeless tobacco: Never   Vaping Use    Vaping status: Every Day   Substance and Sexual Activity    Alcohol use: No    Drug use: Yes     Types: Marijuana     Family History   Problem Relation Age of Onset    Arthritis Mother     Asthma Mother     Cancer Father     Alcohol abuse Father     Arthritis Father     Arthritis Maternal Aunt     Cancer Paternal Aunt     Arthritis Paternal Aunt     Diabetes Paternal Aunt        XR forearm 2 views left    Result Date: 2024  Acute fracture as above. : 1970 Images reviewed, interpreted, and dictated by Briseida Reyes MD         Objective     Blood pressure 138/82, pulse 74, temperature 98.3 °F (36.8 °C),  "temperature source Tympanic, resp. rate 16, height 154.9 cm (61\"), weight 80.3 kg (177 lb), SpO2 96%.  Physical Exam  Vitals and nursing note reviewed.   Constitutional:       Appearance: Normal appearance.   HENT:      Head: Normocephalic and atraumatic.      Nose: Nose normal.      Mouth/Throat:      Mouth: Mucous membranes are moist.      Pharynx: Oropharynx is clear.   Eyes:      Extraocular Movements: Extraocular movements intact.      Conjunctiva/sclera: Conjunctivae normal.      Pupils: Pupils are equal, round, and reactive to light.   Cardiovascular:      Rate and Rhythm: Normal rate and regular rhythm.      Pulses: Normal pulses.      Heart sounds: Normal heart sounds.   Pulmonary:      Effort: Pulmonary effort is normal.      Breath sounds: Normal breath sounds.   Abdominal:      General: Abdomen is flat. Bowel sounds are normal.      Palpations: Abdomen is soft.   Musculoskeletal:         General: Normal range of motion.      Cervical back: Normal range of motion and neck supple.   Skin:     General: Skin is warm.      Capillary Refill: Capillary refill takes 2 to 3 seconds.   Neurological:      Mental Status: She is alert and oriented to person, place, and time. Mental status is at baseline.   Psychiatric:         Mood and Affect: Mood normal.         Behavior: Behavior normal.       Immunization History   Administered Date(s) Administered    COVID-19 (PFIZER) Purple Cap Monovalent 04/16/2021, 05/07/2021            Assessment & Plan     Diagnoses and all orders for this visit:    1. Mixed simple and mucopurulent chronic bronchitis (Primary)  -     Complete PFT - Pre & Post Bronchodilator; Future  -     CT Chest With Contrast; Future    2. Tobacco abuse, in remission  -     Complete PFT - Pre & Post Bronchodilator; Future  -     CT Chest With Contrast; Future    3. Friedreich's ataxia (Neurology)  -     Complete PFT - Pre & Post Bronchodilator; Future  -     CT Chest With Contrast; Future         Result " Review :       Data reviewed : Radiologic studies chest      Discussion/ Recommendations:   X-rays reviewed no infiltrates noted  We will order PFTs and CT chest for evaluation of her COPD  Advised to continue Symbicort inhaler Q12  Albuterol inhaler and neb treatments as needed  Advised to reduce weight her BMI is 33.44  Discussed vaccination and recommended    BMI is >= 30 and <35. (Class 1 Obesity). The following options were offered after discussion;: exercise counseling/recommendations           Return in about 2 months (around 5/25/2024).      Thank you for allowing me to participate in the care of Lyly DAVE Zeng. Please do not hesitate to contact me with any questions.         This document has been electronically signed by Loi Triplett MD on March 25, 2024 13:28 EDT

## 2024-04-01 ENCOUNTER — PATIENT ROUNDING (BHMG ONLY) (OUTPATIENT)
Dept: PULMONOLOGY | Facility: CLINIC | Age: 54
End: 2024-04-01
Payer: MEDICARE

## 2024-04-01 NOTE — PROGRESS NOTES
April 1, 2024    Hello, may I speak with Lyly Zeng?    My name is Tali      [facility] [place]  [address]  [address]  [facility] [address] [phone].    Before we get started may I verify your date of birth? 1970    I am calling to officially welcome you to our practice and ask about your recent visit. Is this a good time to talk? My chart message sent for patient rounding.

## 2024-05-06 ENCOUNTER — TELEPHONE (OUTPATIENT)
Dept: PULMONOLOGY | Facility: CLINIC | Age: 54
End: 2024-05-06
Payer: MEDICARE

## 2024-05-06 NOTE — TELEPHONE ENCOUNTER
[employer] called in regards to denial for ct chest by insurance. Denial reasoning is due to [employer] not being in network. Please advise how you would like to proceed.

## 2024-05-08 NOTE — TELEPHONE ENCOUNTER
Spoke with patient. Patient will reach out to the insurance company. Patient to call office of outcome. Patient verbalized understanding

## 2024-05-22 ENCOUNTER — TELEPHONE (OUTPATIENT)
Dept: PULMONOLOGY | Facility: CLINIC | Age: 54
End: 2024-05-22

## 2024-05-22 NOTE — TELEPHONE ENCOUNTER
Caller: Lyyl Zeng    Relationship to patient: Self    Best call back number: [phone]     Patient is needing: PATIENT STATES THAT [facility] NEEDS DR. AGUIAR TO CALL THEM FOR CT AUTH. PLEASE CALL BACK TO [facility].

## 2024-06-12 NOTE — TELEPHONE ENCOUNTER
Hub staff attempted to follow warm transfer process and was unsuccessful     Caller: [facility]    Patient is needing: NEEDS TO BE FAXED OR CALLED IN FOR CT AUTH -- APT TOMORROW -- NEEDS TO BE URGENT -- FAX #[phone] AND PHONE NUMBER #[phone]

## 2024-06-13 ENCOUNTER — APPOINTMENT (OUTPATIENT)
Dept: CT IMAGING | Facility: HOSPITAL | Age: 54
End: 2024-06-13
Payer: MEDICARE

## 2024-06-13 ENCOUNTER — HOSPITAL ENCOUNTER (OUTPATIENT)
Dept: RESPIRATORY THERAPY | Facility: HOSPITAL | Age: 54
Discharge: HOME | End: 2024-06-13
Payer: MEDICARE

## 2024-06-13 DIAGNOSIS — G11.11 FRIEDREICH'S ATAXIA: ICD-10-CM

## 2024-06-13 DIAGNOSIS — J41.8 MIXED SIMPLE AND MUCOPURULENT CHRONIC BRONCHITIS: ICD-10-CM

## 2024-06-13 DIAGNOSIS — F17.201 TOBACCO ABUSE, IN REMISSION: ICD-10-CM

## 2024-06-13 PROCEDURE — 94726 PLETHYSMOGRAPHY LUNG VOLUMES: CPT

## 2024-06-13 PROCEDURE — 94729 DIFFUSING CAPACITY: CPT

## 2024-06-13 PROCEDURE — 94060 EVALUATION OF WHEEZING: CPT

## 2024-06-13 RX ORDER — ALBUTEROL SULFATE 2.5 MG/3ML
2.5 SOLUTION RESPIRATORY (INHALATION) ONCE
Status: COMPLETED | OUTPATIENT
Start: 2024-06-13 | End: 2024-06-13

## 2024-06-13 RX ADMIN — ALBUTEROL SULFATE 2.5 MG: 2.5 SOLUTION RESPIRATORY (INHALATION) at 12:43

## 2024-08-23 ENCOUNTER — TELEPHONE (OUTPATIENT)
Dept: PULMONOLOGY | Facility: CLINIC | Age: 54
End: 2024-08-23
Payer: MEDICARE

## 2024-08-23 NOTE — TELEPHONE ENCOUNTER
Pt called back and said insurance company was missing office notes stating that ct was medically necessary

## 2024-08-23 NOTE — TELEPHONE ENCOUNTER
Patient called back. States insurance never approved the CT. Will discuss at her next appointment on 8/28

## 2024-08-26 ENCOUNTER — TELEPHONE (OUTPATIENT)
Dept: PULMONOLOGY | Facility: CLINIC | Age: 54
End: 2024-08-26

## 2024-09-12 ENCOUNTER — OFFICE VISIT (OUTPATIENT)
Dept: PULMONOLOGY | Facility: CLINIC | Age: 54
End: 2024-09-12
Payer: MEDICARE

## 2024-09-12 VITALS
HEIGHT: 61 IN | TEMPERATURE: 97.8 F | WEIGHT: 177 LBS | BODY MASS INDEX: 33.42 KG/M2 | DIASTOLIC BLOOD PRESSURE: 103 MMHG | RESPIRATION RATE: 18 BRPM | HEART RATE: 72 BPM | OXYGEN SATURATION: 98 % | SYSTOLIC BLOOD PRESSURE: 133 MMHG

## 2024-09-12 DIAGNOSIS — F17.201 TOBACCO ABUSE, IN REMISSION: ICD-10-CM

## 2024-09-12 DIAGNOSIS — J44.1 COPD WITH ACUTE EXACERBATION: Primary | ICD-10-CM

## 2024-09-12 DIAGNOSIS — G11.11 FRIEDREICH'S ATAXIA: ICD-10-CM

## 2024-09-12 DIAGNOSIS — R07.89 NON-CARDIAC CHEST PAIN: ICD-10-CM

## 2024-09-12 PROCEDURE — 99214 OFFICE O/P EST MOD 30 MIN: CPT | Performed by: INTERNAL MEDICINE

## 2024-09-12 PROCEDURE — 1160F RVW MEDS BY RX/DR IN RCRD: CPT | Performed by: INTERNAL MEDICINE

## 2024-09-12 PROCEDURE — 1159F MED LIST DOCD IN RCRD: CPT | Performed by: INTERNAL MEDICINE

## 2024-09-12 RX ORDER — FLUTICASONE PROPIONATE 50 MCG
2 SPRAY, SUSPENSION (ML) NASAL DAILY
COMMUNITY
Start: 2024-07-22

## 2024-09-12 RX ORDER — MELOXICAM 15 MG/1
15 TABLET ORAL DAILY
COMMUNITY
Start: 2024-09-03

## 2024-09-12 RX ORDER — MIRTAZAPINE 15 MG/1
15 TABLET, FILM COATED ORAL NIGHTLY
COMMUNITY
Start: 2024-07-26

## 2024-09-12 RX ORDER — MAGNESIUM GLUCONATE 30 MG(550)
30 TABLET ORAL
COMMUNITY

## 2024-09-12 RX ORDER — MECLIZINE HYDROCHLORIDE 25 MG/1
25 TABLET ORAL 3 TIMES DAILY PRN
COMMUNITY
Start: 2024-06-07

## 2024-09-12 RX ORDER — PANTOPRAZOLE SODIUM 40 MG/1
40 TABLET, DELAYED RELEASE ORAL DAILY
COMMUNITY
Start: 2024-09-03

## 2024-09-12 RX ORDER — AZITHROMYCIN 250 MG/1
TABLET, FILM COATED ORAL
Qty: 6 TABLET | Refills: 0 | Status: SHIPPED | OUTPATIENT
Start: 2024-09-12

## 2024-09-12 RX ORDER — MONTELUKAST SODIUM 10 MG/1
10 TABLET ORAL NIGHTLY
COMMUNITY

## 2024-09-12 NOTE — PROGRESS NOTES
Pulmonary Office Follow-up    Subjective     Lyly Zeng is seen today at the office for   Chief Complaint   Patient presents with    Follow-up    Bronchitis    Results    Shortness of Breath    Cough    Wheezing         HPI  Lyly Zeng is a 54 y.o. female with a PMH significant for COPD and Friedreich's ataxia presents for follow-up patient complains of chest aching on the left side of the chest along with cough and mucopurulent expectoration for the past couple of weeks patient takes Breztri twice a day she denies any fever or hemoptysis      Tobacco use history:  Former smoker      Patient Active Problem List   Diagnosis    Dysthymic disorder (Psychiatry)     Other emphysema    GERD without esophagitis    Friedreich's ataxia (Neurology)    Nicotine vapor product user    Elevated cholesterol    Other allergic rhinitis    Impingement syndrome of right shoulder    Right elbow pain    Chronic right shoulder pain    Chronic left shoulder pain    Obesity (BMI 30.0-34.9)       Review of Systems  Review of Systems   Respiratory:  Positive for cough and shortness of breath.    Cardiovascular:  Positive for chest pain.   All other systems reviewed and are negative.    As described in the HPI. Otherwise, remainder of ROS (14 systems) were negative.    Medications, Allergies, Social, and Family Histories reviewed as per EMR.    Result Review :            Objective     Vitals:    09/12/24 1050   BP: (!) 133/103   Pulse: 72   Resp: 18   Temp: 97.8 °F (36.6 °C)   SpO2: 98%         09/12/24  1050   Weight: 80.3 kg (177 lb)       Physical Exam  Vitals and nursing note reviewed.   Constitutional:       Appearance: Normal appearance.   HENT:      Head: Normocephalic and atraumatic.      Nose: Nose normal.      Mouth/Throat:      Mouth: Mucous membranes are moist.      Pharynx: Oropharynx is clear.   Eyes:      Conjunctiva/sclera: Conjunctivae normal.      Pupils: Pupils are equal, round, and reactive to light.    Cardiovascular:      Rate and Rhythm: Normal rate and regular rhythm.      Pulses: Normal pulses.      Heart sounds: Normal heart sounds.   Pulmonary:      Effort: Pulmonary effort is normal.      Breath sounds: Rhonchi present.   Musculoskeletal:         General: Normal range of motion.      Cervical back: Normal range of motion and neck supple.   Skin:     General: Skin is warm.      Capillary Refill: Capillary refill takes 2 to 3 seconds.   Neurological:      Mental Status: She is alert and oriented to person, place, and time.         XR Chest 1 View    Result Date: 7/25/2024  No acute cardiopulmonary process. Images reviewed, interpreted, and dictated by Dr. CHANDNI Tao. Transcribed by Rose Sandoval PA-C.      Assessment & Plan     Diagnoses and all orders for this visit:    1. COPD with acute exacerbation (Primary)  -     CT Chest With Contrast; Future    2. Tobacco abuse, in remission    3. Friedreich's ataxia (Neurology)    4. Non-cardiac chest pain    Other orders  -     azithromycin (ZITHROMAX) 250 MG tablet; Take 2 by mouth today then 1 daily for 4 days  Dispense: 6 tablet; Refill: 0         Discussion/ Recommendations:   Will order CT chest for evaluation  Will start her on Zithromax for 5 days  Breztri 2 puffs twice daily  Patient has already quit smoking  PFTs reviewed showing mild airways obstruction  Vaccinations discussed and recommended             Return in about 3 months (around 12/12/2024).          This document has been electronically signed by Loi Triplett MD on September 12, 2024 11:00 EDT

## 2024-11-07 ENCOUNTER — TRANSCRIBE ORDERS (OUTPATIENT)
Dept: ADMINISTRATIVE | Facility: HOSPITAL | Age: 54
End: 2024-11-07
Payer: MEDICARE

## 2024-11-07 DIAGNOSIS — M81.6 LOCALIZED OSTEOPOROSIS, UNSPECIFIED PATHOLOGICAL FRACTURE PRESENCE: Primary | ICD-10-CM

## 2024-11-13 ENCOUNTER — TELEPHONE (OUTPATIENT)
Dept: PULMONOLOGY | Facility: CLINIC | Age: 54
End: 2024-11-13
Payer: MEDICARE

## 2024-11-13 NOTE — TELEPHONE ENCOUNTER
DATE OF SERVICE:  08/16/17



This 53 -year-old woman was admitted with fever and sepsis, is scheduled to 
have a Perm-A-Cath replacement.   No chest pain. No palpitations. No fever.  
Most recent cultures are negative.   



On exam, the patient is alert and oriented times three.  Pulse 58.  Blood 
pressure 120/52.  Respiratory rate 18.  Temperature 97.8 degrees.   Pulse ox 98
% on room air.   HEENT: Conjunctivae normal.   Neck:  No JVD.   CARDIOVASCULAR: 
S1, S2.  Respiratory: Breath sounds diminished at the bases.   No rhonchi and 
no crackles.   Abdomen soft, nontender.   LEGS:  No edema. No swelling.   CNS: 
No focal deficits.  



LABS:    WBC 11.8, hemoglobin 7.9.   Creatinine 3.1. 



ASSESSMENT:

1.   Fever with possible Vancomycin sensitive enterococcus cocci with 
bacteremia and sepsis. 

2.   End stage renal disease, chronic kidney disease Stage V on hemodialysis.

3.   Anemia secondary to chronic kidney disease. 

4.   E. coli, urinary tract infection. 



RECOMMENDATIONS AND DISCUSSION:  Recommend to continue the current medications, 
continue with symptomatic treatment.   Follow the cultures.  The cultures are 
negative.  Perm-A-Cath.  Resume hemodialysis.   Guarded prognosis. Further 
recommendations to follow. 
[facility] Missed patients call. Returning call. Left voicemail

## 2024-11-20 ENCOUNTER — HOSPITAL ENCOUNTER (OUTPATIENT)
Dept: OTHER | Facility: HOSPITAL | Age: 54
Discharge: HOME | End: 2024-11-20

## 2025-05-08 ENCOUNTER — OFFICE VISIT (OUTPATIENT)
Dept: PULMONOLOGY | Facility: CLINIC | Age: 55
End: 2025-05-08
Payer: MEDICARE

## 2025-05-08 VITALS
WEIGHT: 184.2 LBS | DIASTOLIC BLOOD PRESSURE: 68 MMHG | TEMPERATURE: 96.9 F | HEART RATE: 70 BPM | HEIGHT: 61 IN | RESPIRATION RATE: 16 BRPM | OXYGEN SATURATION: 96 % | BODY MASS INDEX: 34.78 KG/M2 | SYSTOLIC BLOOD PRESSURE: 137 MMHG

## 2025-05-08 DIAGNOSIS — J44.9 CHRONIC OBSTRUCTIVE PULMONARY DISEASE, UNSPECIFIED COPD TYPE: Primary | ICD-10-CM

## 2025-05-08 DIAGNOSIS — Z87.891 PERSONAL HISTORY OF NICOTINE DEPENDENCE: ICD-10-CM

## 2025-05-08 DIAGNOSIS — F17.201 TOBACCO ABUSE, IN REMISSION: ICD-10-CM

## 2025-05-08 DIAGNOSIS — M54.6 ACUTE RIGHT-SIDED THORACIC BACK PAIN: ICD-10-CM

## 2025-05-08 DIAGNOSIS — R06.00 DYSPNEA, UNSPECIFIED TYPE: ICD-10-CM

## 2025-05-08 DIAGNOSIS — G11.11 FRIEDREICH'S ATAXIA: ICD-10-CM

## 2025-05-08 RX ORDER — AMANTADINE HYDROCHLORIDE 100 MG/1
100 TABLET ORAL DAILY
COMMUNITY
Start: 2025-01-31

## 2025-05-08 RX ORDER — BENZOCAINE/MENTHOL 6 MG-10 MG
LOZENGE MUCOUS MEMBRANE
COMMUNITY
Start: 2025-03-26

## 2025-05-08 RX ORDER — ATORVASTATIN CALCIUM 10 MG/1
10 TABLET, FILM COATED ORAL DAILY
COMMUNITY
Start: 2025-04-26

## 2025-05-08 RX ORDER — PHENAZOPYRIDINE HYDROCHLORIDE 200 MG/1
TABLET, FILM COATED ORAL
COMMUNITY
Start: 2025-02-28

## 2025-05-08 RX ORDER — BACLOFEN 10 MG/1
10 TABLET ORAL
COMMUNITY
Start: 2024-12-04

## 2025-05-08 NOTE — PROGRESS NOTES
Primary Care Provider  Bianca Garrett APRN   Referring Provider  No ref. provider found      Patient Complaint  Follow-up (3 MONTH) and Pain      Subjective          Lyly Zeng presents to [facility]    Patient or patient representative verbalized consent for the use of Ambient Listening during the visit with  DARYL Armenta for chart documentation. 5/8/2025  10:19 EDT    History of Presenting Illness  Lyly Zeng is a 54 y.o. female patient of Dr. Triplett with COPD/chronic bronchitis, and tobacco use in remission, here for 8 month follow-up.    History of Present Illness  The patient presents for evaluation of chronic bronchitis. She reports experiencing dyspnea occasionally, to the extent that she is unable to complete a sentence without pausing for breath. Despite this, she has not required any breathing treatments for the past 7 to 8 months. She expresses concern about potential weight gain associated with her inhaler use. She has been using her rescue inhaler more frequently and discontinued Symbicort about a week ago due to perceived weight gain. She has not had any recent emergency room visits or hospitalizations. She has not been prescribed any antibiotics or steroids for her pulmonary condition recently. She is uncertain whether her respiratory symptoms are attributable to her lungs or ataxia.    SOCIAL HISTORY  The patient is a former smoker, quit 3 years ago, 28 pack years.    Pulmonary Meds  Symbicort--discontinued by patient  Albuterol inhaler  Albuterol nebs  Singulair  Flonase    Pulmonary Equipment  None      Family History   Problem Relation Age of Onset    Arthritis Mother     Asthma Mother     Cancer Father     Alcohol abuse Father     Arthritis Father     Arthritis Maternal Aunt     Cancer Paternal Aunt     Arthritis Paternal Aunt     Diabetes Paternal Aunt         Social History     Socioeconomic History    Marital status:     Tobacco Use    Smoking status: Former     Current packs/day: 0.00     Average packs/day: 1 pack/day for 28.0 years (28.0 ttl pk-yrs)     Types: Cigarettes     Start date: 1994     Quit date: 2022     Years since quitting: 3.3    Smokeless tobacco: Never   Vaping Use    Vaping status: Every Day    Substances: Nicotine, Flavoring    Devices: Refillable tank   Substance and Sexual Activity    Alcohol use: No    Drug use: Yes     Types: Marijuana        Past Medical History:   Diagnosis Date    Arthritis     Asthma     Ataxia     DOES NOT HAVE GOOD BALANCE    COPD (chronic obstructive pulmonary disease)     Depression     GERD (gastroesophageal reflux disease)     Learning disability     Panic attacks         Immunization History   Administered Date(s) Administered    COVID-19 (PFIZER) Purple Cap Monovalent 04/16/2021, 05/07/2021    Influenza Inj MDCK Preserative Free 11/04/2024       Allergies   Allergen Reactions    Ampicillin Hives     Unknown//cant remember    Aspirin GI Bleeding     Makes her stomach bleed    Oxcarbazepine Itching    Pantoprazole Other (See Comments)     Muscle aches    Acetaminophen Other (See Comments)    Diclofenac Other (See Comments)          Current Outpatient Medications:     albuterol (PROVENTIL HFA;VENTOLIN HFA) 108 (90 BASE) MCG/ACT inhaler, Inhale 2 puffs 4 (Four) Times a Day. Indications: Asthma, Disp: , Rfl:     albuterol (PROVENTIL) (2.5 MG/3ML) 0.083% nebulizer solution, Take 2.5 mg by nebulization Every 4 (Four) Hours As Needed for Wheezing., Disp: , Rfl:     ASHWAGANDHA PO, Take  by mouth., Disp: , Rfl:     atorvastatin (LIPITOR) 10 MG tablet, Take 1 tablet by mouth Daily., Disp: , Rfl:     baclofen (LIORESAL) 10 MG tablet, Take 1 tablet by mouth., Disp: , Rfl:     bisacodyl (DULCOLAX) 5 MG EC tablet, Take 1 tablet by mouth Daily As Needed., Disp: , Rfl:     Calcium Carb-Cholecalciferol (CALCIUM 600 + D PO), Take  by mouth., Disp: , Rfl:     clonazePAM (KlonoPIN) 0.5 MG tablet,  Take 1 tablet by mouth 3 (Three) Times a Day. (Patient taking differently: Take 1 tablet by mouth 2 (Two) Times a Day As Needed. Indications: anx), Disp: 90 tablet, Rfl: 1    fluticasone (FLONASE) 50 MCG/ACT nasal spray, Administer 2 sprays into the nostril(s) as directed by provider Daily., Disp: , Rfl:     gabapentin (NEURONTIN) 400 MG capsule, Take 1 capsule by mouth Daily., Disp: , Rfl:     hydrocortisone 1 % cream, Apply  topically to the appropriate area as directed., Disp: , Rfl:     ibuprofen (ADVIL,MOTRIN) 200 MG tablet, Take 1 tablet by mouth Every 6 (Six) Hours As Needed for Mild Pain., Disp: , Rfl:     ipratropium (ATROVENT) 0.06 % nasal spray, Administer 2 sprays into the nostril(s) as directed by provider 4 (Four) Times a Day., Disp: , Rfl:     loratadine (CLARITIN) 10 MG tablet, Take 1 tablet by mouth Daily. (Patient taking differently: Take 1 tablet by mouth Daily. PRN), Disp: , Rfl:     Magnesium Gluconate 550 MG tablet, Take 30 mg by mouth., Disp: , Rfl:     meclizine (ANTIVERT) 25 MG tablet, Take 1 tablet by mouth 3 (Three) Times a Day As Needed for Dizziness or Nausea., Disp: , Rfl:     meloxicam (MOBIC) 15 MG tablet, Take 1 tablet by mouth Daily., Disp: , Rfl:     mirtazapine (REMERON) 15 MG tablet, Take 1 tablet by mouth Every Night., Disp: , Rfl:     nystatin (MYCOSTATIN) 648216 UNIT/GM cream, Apply 1 Application topically to the appropriate area as directed As Needed., Disp: , Rfl:     pantoprazole (PROTONIX) 40 MG EC tablet, Take 1 tablet by mouth Daily., Disp: , Rfl:     Probiotic Product (PROBIOTIC PO), Take  by mouth., Disp: , Rfl:     Symbicort 80-4.5 MCG/ACT inhaler, Inhale 2 puffs 2 (Two) Times a Day., Disp: , Rfl:     vitamin B-12 (CYANOCOBALAMIN) 1000 MCG tablet, Take 1 tablet by mouth Daily., Disp: , Rfl:     albuterol (PROVENTIL) (5 MG/ML) 0.5% nebulizer solution, Take 0.5 mL by nebulization Every 6 (Six) Hours As Needed for Wheezing. (Patient not taking: Reported on 5/8/2025),  "Disp: , Rfl:     amantadine (SYMMETREL) 100 MG tablet, Take 1 tablet by mouth Daily. (Patient not taking: Reported on 5/8/2025), Disp: , Rfl:     azithromycin (ZITHROMAX) 250 MG tablet, Take 2 by mouth today then 1 daily for 4 days, Disp: 6 tablet, Rfl: 0    benzonatate (TESSALON) 200 MG capsule, Take 1 capsule by mouth. (Patient not taking: Reported on 5/8/2025), Disp: , Rfl:     Budeson-Glycopyrrol-Formoterol (Breztri Aerosphere) 160-9-4.8 MCG/ACT aerosol inhaler, Inhale 2 puffs 2 (Two) Times a Day. (Patient not taking: Reported on 5/8/2025), Disp: , Rfl:     FLUoxetine (PROzac) 20 MG capsule, Take 1 capsule by mouth Daily. (Patient not taking: Reported on 5/8/2025), Disp: , Rfl:     Magnesium Gluconate 550 MG tablet, Take 30 mg by mouth. (Patient not taking: Reported on 5/8/2025), Disp: , Rfl:     montelukast (SINGULAIR) 10 MG tablet, Take 1 tablet by mouth Every Night. (Patient not taking: Reported on 5/8/2025), Disp: , Rfl:     oseltamivir (TAMIFLU) 75 MG capsule, Take 1 capsule by mouth Daily., Disp: , Rfl:     PARoxetine (PAXIL) 10 MG tablet, Take 1 tablet by mouth Every Morning. (Patient not taking: Reported on 5/8/2025), Disp: , Rfl:     phenazopyridine (PYRIDIUM) 200 MG tablet, , Disp: , Rfl:     traZODone (DESYREL) 50 MG tablet, Take 1 tablet by mouth Every Night. (Patient not taking: Reported on 5/8/2025), Disp: , Rfl:     TURMERIC PO, Take  by mouth. (Patient not taking: Reported on 5/8/2025), Disp: , Rfl:      Objective     Vital Signs:   /68 (BP Location: Right arm, Patient Position: Sitting, Cuff Size: Adult)   Pulse 70   Temp 96.9 °F (36.1 °C) (Tympanic)   Resp 16   Ht 154.9 cm (61\")   Wt 83.6 kg (184 lb 3.2 oz)   SpO2 96% Comment: ROOM AIR  BMI 34.80 kg/m²     Physical Exam  Constitutional:       General: She is not in acute distress.     Appearance: Normal appearance. She is normal weight. She is not ill-appearing.   HENT:      Right Ear: Tympanic membrane and ear canal normal.      " Left Ear: Tympanic membrane and ear canal normal.      Nose: Nose normal.      Mouth/Throat:      Mouth: Mucous membranes are moist.      Pharynx: Oropharynx is clear.   Cardiovascular:      Rate and Rhythm: Normal rate and regular rhythm.      Pulses: Normal pulses.      Heart sounds: Normal heart sounds.   Pulmonary:      Effort: Pulmonary effort is normal. No respiratory distress.      Breath sounds: Normal breath sounds. No stridor. No wheezing, rhonchi or rales.   Musculoskeletal:         General: No swelling. Normal range of motion.      Cervical back: Normal range of motion and neck supple.      Right lower leg: No edema.      Left lower leg: No edema.   Skin:     General: Skin is warm and dry.   Neurological:      General: No focal deficit present.      Mental Status: She is alert and oriented to person, place, and time.      Motor: No weakness.   Psychiatric:         Mood and Affect: Mood normal.         Behavior: Behavior normal.          Result Review :   I have personally reviewed patient's labs and images.    Results    -PFTs 6/13/2024 showed mild obstructive disease with good response to bronchodilator.  Lung volumes normal, normal DLCO.  -CXR 7/25/2025 showed lungs clear, no acute abnormalities            Diagnoses and all orders for this visit:    1. Chronic obstructive pulmonary disease, unspecified COPD type (Primary)    2. Tobacco abuse, in remission  -      CT Chest Low Dose Cancer Screening WO; Future    3. Dyspnea, unspecified type    4. Acute right-sided thoracic back pain    5. Friedreich's ataxia (Neurology)    6. Personal history of nicotine dependence  -      CT Chest Low Dose Cancer Screening WO; Future       Assessment & Plan  1. Chronic Bronchitis.  Her pulmonary function test from last year indicated mild obstructive disease with a positive response to bronchodilators, suggesting a diagnosis of mild COPD/asthma. She has been using her albuterol inhaler as needed and has stopped using  Symbicort due to concerns about weight gain. She is advised to continue using her albuterol inhaler as needed. A CT scan will be ordered today to rule out any structural abnormalities in her lungs, suspicious nodules or masses, and potential lung damage from her smoking history. If she experiences increased shortness of breath, cough, or wheezing, she should resume using Symbicort.    2. Osteoporosis.  She received a Prolia injection on March 3, 2025, and has since experienced swelling in her legs, feet, and ankles, as well as the development of varicose veins. She is advised to discuss these symptoms with her primary care physician and consider alternative osteoporosis treatments if necessary.    3. Heart Palpitations.  She reports experiencing heart palpitations that she feels in her back. Her heart sounds were regular during the examination. She is advised to monitor her symptoms and follow up with her primary care physician or cardiologist if the palpitations persist or worsen.    4. Headaches.  She has been using naproxen sodium for headaches, which she finds effective. She is advised that occasional use of naproxen sodium is acceptable, but frequent use can lead to gastrointestinal bleeding. She should avoid acetaminophen and aspirin due to previous adverse reactions.    -CT chest reordered today  -Continue using albuterol inhaler or albuterol nebs as needed  -Continue taking Singulair, Flonase for allergies  -Follow up in 3 months, may return sooner if needed    Smoking status: Reviewed  Vaccination status: Patient declines pneumonia vaccine, will be due for flu vaccine in the fall.  Patient is advised to continue to follow CDC recommendations such as social distancing wearing a mask and washing hands for at least 20 seconds.  Medications personally reviewed    Follow Up   No follow-ups on file.  Patient was given instructions and counseling regarding her condition or for health maintenance advice. Please see  specific information pulled into the AVS if appropriate.

## 2025-05-21 ENCOUNTER — HOSPITAL ENCOUNTER (OUTPATIENT)
Dept: CT IMAGING | Facility: HOSPITAL | Age: 55
Discharge: HOME | End: 2025-05-21
Payer: MEDICARE

## 2025-05-21 DIAGNOSIS — F17.201 TOBACCO ABUSE, IN REMISSION: ICD-10-CM

## 2025-05-21 DIAGNOSIS — Z87.891 PERSONAL HISTORY OF NICOTINE DEPENDENCE: ICD-10-CM

## 2025-05-21 PROCEDURE — 71271 CT THORAX LUNG CANCER SCR C-: CPT

## 2025-06-02 ENCOUNTER — TELEPHONE (OUTPATIENT)
Age: 55
End: 2025-06-02
Payer: MEDICARE

## 2025-06-02 NOTE — TELEPHONE ENCOUNTER
Spoke to patient to let her know that with her Insurance she will be out of network and may require to pay out of pocket. Advised patient to call her insurance to see if an annual would be out of network and if so how much would she have to pay. She voiced understanding.

## 2025-06-09 ENCOUNTER — OFFICE VISIT (OUTPATIENT)
Age: 55
End: 2025-06-09
Payer: MEDICARE

## 2025-06-09 VITALS
WEIGHT: 185.8 LBS | BODY MASS INDEX: 35.08 KG/M2 | HEART RATE: 96 BPM | SYSTOLIC BLOOD PRESSURE: 142 MMHG | DIASTOLIC BLOOD PRESSURE: 92 MMHG | HEIGHT: 61 IN | OXYGEN SATURATION: 98 % | RESPIRATION RATE: 18 BRPM

## 2025-06-09 DIAGNOSIS — Z01.419 WOMEN'S ANNUAL ROUTINE GYNECOLOGICAL EXAMINATION: Primary | ICD-10-CM

## 2025-06-09 DIAGNOSIS — N30.10 INTERSTITIAL CYSTITIS: ICD-10-CM

## 2025-06-09 DIAGNOSIS — N76.4 LABIAL ABSCESS: ICD-10-CM

## 2025-06-09 NOTE — PROGRESS NOTES
"GYN Visit    Chief Complaint   Patient presents with    Gynecologic Exam     Patient reports cyst in her vaginal area, she reports it has been there for about 4 years       HPI:   55 y.o. LMP: Patient's last menstrual period was 06/15/2016 (approximate).  Patient is here for a GYN exam and evaluation of a labial abscess.  She has no history of abnormal Pap smears in the past.  She reports that her bladder is becoming more symptomatic.  She was diagnosed with intracystic cystitis.  She had cystoscopy and hydrodistention several years ago.  She desires repeat of that and will let us know when she is able to schedule procedure.  Patient also has a left labial abscess which she reports has gotten bigger and more symptomatic.  She desires incision and drainage of abscess today if possible.  Patient had recent mammogram and was normal.          History: PMHx, Meds, Allergies, PSHx, Social Hx, and POBHx all reviewed and updated.    PHYSICAL EXAM:  /92   Pulse 96   Resp 18   Ht 154.9 cm (61\")   Wt 84.3 kg (185 lb 12.8 oz)   LMP 06/15/2016 (Approximate) Comment: URINE HCG NEG  SpO2 98%   BMI 35.11 kg/m²   General- NAD, alert and oriented, appropriate  Psych- Normal mood, good memory    Neck- No masses, no thyroid enlargement  Cardiovascular- Regular rhythm, no murnurs  Respiratory- CTA to bases, no wheezes  Abdomen- Soft, non distended, non tender, no masses    Breast left-  Bilaterally symmetrical, no masses, non tender, no nipple discharge  Breast right- Bilaterally symmetrical, no masses, non tender, no nipple discharge    External genitalia-there is about 2 to 3 cm right labial abscess which is fluctuant.  Urethra/meatus- Normal, no masses, non tender, no prolapse  Bladder- Normal, no masses, non tender, no prolapse  Vagina- Normal, no atrophy, no lesions, no discharge, no prolapse  Cervix- Normal, no lesions, no discharge, No cervical motion tenderness  Uterus- Normal size, shape & consistency.  Non " tender, mobile.  Adnexa- No mass, non tender      Procedures:  Incision and drainage of labial abscess:  After prepping the skin with Betadine, 1% lidocaine was injected.  Incision and drainage of the abscess was performed with return of large amount of mucoid/purulent fluid    ASSESSMENT AND PLAN:  Assessment & Plan  Women's annual routine gynecological examination  Patient declined Pap smear.  She has no history of abnormal Pap smears in the past.       Labial abscess  Incision and drainage of abscess was performed       Interstitial cystitis  Patient will call us when she is ready to schedule cystoscopy and hydrodistention                   Follow Up:  No follow-ups on file.        Pierce Tracey MD  06/09/2025    [facility]  [address] [address]  Dept: [phone]  Dept Fax: [phone]  Loc: [phone]  Loc Fax: [phone]

## 2025-06-10 PROBLEM — N30.10 INTERSTITIAL CYSTITIS: Status: ACTIVE | Noted: 2025-06-09

## 2025-06-10 RX ORDER — SODIUM CHLORIDE 9 MG/ML
40 INJECTION, SOLUTION INTRAVENOUS AS NEEDED
OUTPATIENT
Start: 2025-06-10

## 2025-06-10 RX ORDER — SODIUM CHLORIDE 0.9 % (FLUSH) 0.9 %
3 SYRINGE (ML) INJECTION EVERY 12 HOURS SCHEDULED
OUTPATIENT
Start: 2025-06-10

## 2025-06-10 RX ORDER — SODIUM CHLORIDE, SODIUM LACTATE, POTASSIUM CHLORIDE, CALCIUM CHLORIDE 600; 310; 30; 20 MG/100ML; MG/100ML; MG/100ML; MG/100ML
125 INJECTION, SOLUTION INTRAVENOUS CONTINUOUS
OUTPATIENT
Start: 2025-06-10 | End: 2025-06-10

## 2025-06-10 RX ORDER — SODIUM CHLORIDE 0.9 % (FLUSH) 0.9 %
10 SYRINGE (ML) INJECTION AS NEEDED
OUTPATIENT
Start: 2025-06-10

## 2025-06-18 ENCOUNTER — OFFICE (AMBULATORY)
Dept: URBAN - METROPOLITAN AREA CLINIC 76 | Facility: CLINIC | Age: 55
End: 2025-06-18
Payer: COMMERCIAL

## 2025-06-18 VITALS
OXYGEN SATURATION: 98 % | HEART RATE: 90 BPM | DIASTOLIC BLOOD PRESSURE: 66 MMHG | WEIGHT: 184 LBS | HEIGHT: 60 IN | SYSTOLIC BLOOD PRESSURE: 128 MMHG

## 2025-06-18 DIAGNOSIS — R11.0 NAUSEA: ICD-10-CM

## 2025-06-18 DIAGNOSIS — Z86.0101 PERSONAL HISTORY OF ADENOMATOUS AND SERRATED COLON POLYPS: ICD-10-CM

## 2025-06-18 DIAGNOSIS — K21.9 GASTRO-ESOPHAGEAL REFLUX DISEASE WITHOUT ESOPHAGITIS: ICD-10-CM

## 2025-06-18 DIAGNOSIS — R13.10 DYSPHAGIA, UNSPECIFIED: ICD-10-CM

## 2025-06-18 DIAGNOSIS — R19.7 DIARRHEA, UNSPECIFIED: ICD-10-CM

## 2025-06-18 PROCEDURE — 99213 OFFICE O/P EST LOW 20 MIN: CPT

## 2025-07-07 ENCOUNTER — TELEPHONE (OUTPATIENT)
Age: 55
End: 2025-07-07

## 2025-07-07 RX ORDER — VONOPRAZAN FUMARATE 26.72 MG/1
20 TABLET ORAL DAILY
COMMUNITY

## 2025-07-07 NOTE — PRE-PROCEDURE INSTRUCTIONS
PATIENT INSTRUCTED TO BE:    - NOTHING TO EAT AFTER MIDNIGHT OR CHEW, EXCEPT CAN HAVE SIPS OF WATER WITH MEDICATIONS   - TO HOLD ALL VITAMINS, SUPPLEMENTS, NSAIDS FOR ONE WEEK PRIOR TO THEIR SURGICAL PROCEDURE    - DO NOT TAKE ___________NA___________ 7 DAYS PRIOR TO PROCEDURE PER ANESTHESIA RECOMMENDATIONS/INSTRUCTIONS     - INSTRUCTED PT TO USE SURGICAL SOAP 1 TIME THE NIGHT PRIOR TO SURGERY __NA_________ OR THE AM OF SURGERY ___NA__________   USE THE SOAP FROM NECK TO TOES, AVOID THEIR FACE, HAIR, AND PRIVATE PARTS. IF USE THE SOAP THE NIGHT PRIOR TO SURGERY, CHANGE BED LINENS AND NO PETS IN THE BED.     INSTRUCTED NO LOTIONS, JEWELRY, PIERCINGS,  NAIL POLISH, OR DEODORANT DAY OF SURGERY    - IF DIABETIC, CHECK BLOOD GLUCOSE IF LESS THAN 70 OR HAVING SYMPTOMS CALL THE PREOP AREA FOR INSTRUCTIONS ON AM OF SURGERY ([phone] )    -INSTRUCTED TO TAKE THE FOLLOWING MEDICATIONS THE DAY OF SURGERY WITH SIPS OF WATER:       INHALER, LIPITOR, BACLOFEN, COLACE, KLONOPIN, FLONASE, GABAPENTIN, CLARITIN, VOQUEZNA         - DO NOT BRING ANY MEDICATIONS WITH YOU TO THE HOSPITAL THE DAY OF SURGERY, EXCEPT IF USE INHALERS. BRING INHALERS DAY OF SURGERY       - BRING CPAP OR BIPAP TO THE HOSPITAL ONLY IF YOU ARE SPENDING THE NIGHT    - DO NOT SMOKE OR VAPE 24 HOURS PRIOR TO PROCEDURE PER ANESTHESIA REQUEST     -MAKE SURE YOU HAVE A RIDE HOME OR SOMEONE TO STAY WITH YOU THE DAY OF THE PROCEDURE AFTER YOU GO HOME     - FOLLOW ANY OTHER INSTRUCTIONS GIVEN TO YOU BY YOUR SURGEON'S OFFICE.     - DAY OF SURGERY __5-43-21__________,[facility] ( [address]), YOU CAN  PARK OR SELF PARK. ENTER THE PAVILION THRU MAIN ENTRANCE, TAKE ELEVATORS TO THE FIRST FLOOR, CHECK IN AT THE DESK FOR REGISTRATION/ SURGERY.   - YOU WILL RECEIVE A PHONE CALL THE DAY PRIOR TO SURGERY BETWEEN 1PM AND 4 PM WITH ARRIVAL TIME, IF YOUR SURGERY IS ON A MONDAY YOU WILL RECEIVE A CALL THE FRIDAY PRIOR TO SURGERY DATE    -  BRING CASH OR CREDIT CARD FOR COPAYMENT OF MEDICATIONS AFTER SURGERY IF YOU USE THE HOSPITAL PHARMACY (MEDS TO BED)    - PREADMISSION TESTING NURSE COLLIN MIKE -[phone] IF HAVE ANY QUESTIONS     -PATIENT PROVIDED THE NUMBER FOR PREOP SURGICAL DEPT IF HAD QUESTIONS AFTER HOURS PRIOR TO SURGERY ([phone] .  INFORMED PT IF NO ANSWER, LEAVE A MESSAGE AND SOMEONE WILL RETURN THEIR CALL       PATIENT VERBALIZED UNDERSTANDING

## 2025-07-08 ENCOUNTER — TELEPHONE (OUTPATIENT)
Age: 55
End: 2025-07-08
Payer: MEDICARE

## 2025-07-08 NOTE — TELEPHONE ENCOUNTER
Attempted to call patient to inform her that the provider does not have an earlier appointment for her procedure on 07/11/2025/ WILIAN

## 2025-07-08 NOTE — TELEPHONE ENCOUNTER
Name: Lyly Zeng      Relationship: Self      Best Callback Number: [phone]      HUB PROVIDED THE RELAY MESSAGE FROM THE OFFICE      PATIENT: VOICED UNDERSTANDING AND HAS NO FURTHER QUESTIONS AT THIS TIME

## 2025-07-10 ENCOUNTER — TELEPHONE (OUTPATIENT)
Age: 55
End: 2025-07-10

## 2025-07-11 ENCOUNTER — HOSPITAL ENCOUNTER (OUTPATIENT)
Facility: HOSPITAL | Age: 55
Discharge: HOME | End: 2025-07-11
Attending: OBSTETRICS & GYNECOLOGY | Admitting: OBSTETRICS & GYNECOLOGY
Payer: MEDICARE

## 2025-07-11 ENCOUNTER — ANESTHESIA EVENT (OUTPATIENT)
Dept: PREOP | Facility: HOSPITAL | Age: 55
End: 2025-07-11
Payer: MEDICARE

## 2025-07-11 ENCOUNTER — ANESTHESIA (OUTPATIENT)
Dept: PREOP | Facility: HOSPITAL | Age: 55
End: 2025-07-11
Payer: MEDICARE

## 2025-07-11 VITALS
BODY MASS INDEX: 36.4 KG/M2 | WEIGHT: 185.41 LBS | HEIGHT: 60 IN | OXYGEN SATURATION: 96 % | DIASTOLIC BLOOD PRESSURE: 117 MMHG | TEMPERATURE: 98 F | SYSTOLIC BLOOD PRESSURE: 138 MMHG | HEART RATE: 78 BPM | RESPIRATION RATE: 18 BRPM

## 2025-07-11 PROCEDURE — G0463 HOSPITAL OUTPT CLINIC VISIT: HCPCS | Performed by: OBSTETRICS & GYNECOLOGY

## 2025-07-11 RX ORDER — SODIUM CHLORIDE 9 MG/ML
40 INJECTION, SOLUTION INTRAVENOUS AS NEEDED
Status: DISCONTINUED | OUTPATIENT
Start: 2025-07-11 | End: 2025-07-11 | Stop reason: HOSPADM

## 2025-07-11 RX ORDER — SODIUM CHLORIDE 0.9 % (FLUSH) 0.9 %
10 SYRINGE (ML) INJECTION AS NEEDED
Status: DISCONTINUED | OUTPATIENT
Start: 2025-07-11 | End: 2025-07-11 | Stop reason: HOSPADM

## 2025-07-11 RX ORDER — MIDAZOLAM HYDROCHLORIDE 2 MG/2ML
2 INJECTION, SOLUTION INTRAMUSCULAR; INTRAVENOUS ONCE
Status: DISCONTINUED | OUTPATIENT
Start: 2025-07-11 | End: 2025-07-11 | Stop reason: HOSPADM

## 2025-07-11 RX ORDER — SODIUM CHLORIDE, SODIUM LACTATE, POTASSIUM CHLORIDE, CALCIUM CHLORIDE 600; 310; 30; 20 MG/100ML; MG/100ML; MG/100ML; MG/100ML
125 INJECTION, SOLUTION INTRAVENOUS CONTINUOUS
Status: DISCONTINUED | OUTPATIENT
Start: 2025-07-11 | End: 2025-07-11 | Stop reason: HOSPADM

## 2025-07-11 RX ORDER — SODIUM CHLORIDE, SODIUM LACTATE, POTASSIUM CHLORIDE, CALCIUM CHLORIDE 600; 310; 30; 20 MG/100ML; MG/100ML; MG/100ML; MG/100ML
9 INJECTION, SOLUTION INTRAVENOUS CONTINUOUS PRN
Status: DISCONTINUED | OUTPATIENT
Start: 2025-07-11 | End: 2025-07-11 | Stop reason: HOSPADM

## 2025-07-11 RX ORDER — SODIUM CHLORIDE 0.9 % (FLUSH) 0.9 %
3 SYRINGE (ML) INJECTION EVERY 12 HOURS SCHEDULED
Status: DISCONTINUED | OUTPATIENT
Start: 2025-07-11 | End: 2025-07-11 | Stop reason: HOSPADM

## 2025-07-11 NOTE — ANESTHESIA PREPROCEDURE EVALUATION
Anesthesia Evaluation     Patient summary reviewed and Nursing notes reviewed                Airway   Mallampati: I  TM distance: >3 FB  Neck ROM: full  No difficulty expected  Dental      Pulmonary - normal exam    breath sounds clear to auscultation  (+) COPD, asthma,  Cardiovascular - normal exam    Rhythm: regular  Rate: normal    (+) hyperlipidemia      Neuro/Psych  (+) numbness, psychiatric history Depression  GI/Hepatic/Renal/Endo    (+) obesity, GERD    Musculoskeletal     Abdominal    Substance History - negative use     OB/GYN negative ob/gyn ROS         Other   arthritis,     ROS/Med Hx Other: Friedreich's ataxia  Panic attacks              Anesthesia Plan    ASA 3     general     intravenous induction     Anesthetic plan, risks, benefits, and alternatives have been provided, discussed and informed consent has been obtained with: patient.    CODE STATUS:         
Patient/Caregiver provided printed discharge information.

## 2025-07-11 NOTE — H&P
[facility]   PREOPERATIVE HISTORY AND PHYSICAL    Patient Name:Lyly Zeng  : 1970  MRN: [id number]  Primary Care Physician: Bianca Garrett APRN  Date of admission: 2025    Subjective   Subjective     Chief Complaint:  Interstitial cystitis    History of Present Illness:   History of Present Illness  Lyly Zeng is a 55 y.o. female with interstitial cystitis who presents for preoperative evaluation. She is scheduled for CYSTOSCOPY BLADDER HYDRODISTENSION (N/A).    Review of Systems     Personal History     Past Medical History:   Diagnosis Date    Arthritis     Asthma     Ataxia     DOES NOT HAVE GOOD BALANCE    COPD (chronic obstructive pulmonary disease)     Depression     GERD (gastroesophageal reflux disease)     Learning disability     Panic attacks        Past Surgical History:   Procedure Laterality Date    COLONOSCOPY      POLYPS    EYE MUSCLE SURGERY Right 2016    Procedure: RIGHT EYE MEDIAL RECTUS RECESSION;  Surgeon: John Kirkpatrick MD;  Location: [facility];  Service:     LAPAROSCOPIC TUBAL LIGATION Bilateral     OVARIAN CYST REMOVAL Bilateral     TONSILLECTOMY         Family History: Her family history includes Alcohol abuse in her father; Arthritis in her father, maternal aunt, mother, and paternal aunt; Asthma in her mother; Cancer in her father and paternal aunt; Diabetes in her paternal aunt.     Social History: She  reports that she quit smoking about 3 years ago. Her smoking use included cigarettes. She started smoking about 31 years ago. She has a 28 pack-year smoking history. She has never used smokeless tobacco. She reports current drug use. Drug: Marijuana. She reports that she does not drink alcohol.    Home Medications:  Calcium Carb-Cholecalciferol, Vonoprazan Fumarate, albuterol, albuterol sulfate HFA, atorvastatin, baclofen, bisacodyl, budesonide-formoterol, clonazePAM, fluticasone, gabapentin, hydrocortisone, ibuprofen, loratadine, meloxicam,  nystatin, and vitamin B-12    Allergies:  She is allergic to ampicillin, aspirin, oxcarbazepine, pantoprazole, acetaminophen, and diclofenac.    Objective    Objective     Vitals:    Temp:  [98 °F (36.7 °C)] 98 °F (36.7 °C)  Heart Rate:  [78] 78  Resp:  [18] 18  BP: (138)/(117) 138/117    Physical Exam  Lungs are clear to oscillation bilaterally  Heart is regular rhythm  Abdomen soft nontender    Assessment & Plan   Assessment / Plan     Brief Patient Summary:  Lyly Zeng is a 55 y.o. female who presents for preoperative evaluation.    Pre-Op Diagnosis Codes:      * Interstitial cystitis [N30.10]    Active Hospital Problems:  Active Hospital Problems    Diagnosis     **Interstitial cystitis      Plan:   Procedure(s):  CYSTOSCOPY BLADDER HYDRODISTENSION    The risks, benefits, and alternatives of the procedure were discussed in detail with the patient and questions were answered. No guarantees were made or implied. Informed consent was obtained.

## 2025-07-14 ENCOUNTER — TELEPHONE (OUTPATIENT)
Age: 55
End: 2025-07-14
Payer: MEDICARE

## 2025-07-14 ENCOUNTER — PREP FOR SURGERY (OUTPATIENT)
Dept: OTHER | Facility: HOSPITAL | Age: 55
End: 2025-07-14
Payer: MEDICARE

## 2025-07-14 DIAGNOSIS — N30.10 INTERSTITIAL CYSTITIS: Primary | ICD-10-CM

## 2025-07-14 RX ORDER — SODIUM CHLORIDE 0.9 % (FLUSH) 0.9 %
10 SYRINGE (ML) INJECTION AS NEEDED
OUTPATIENT
Start: 2025-07-14

## 2025-07-14 RX ORDER — SODIUM CHLORIDE, SODIUM LACTATE, POTASSIUM CHLORIDE, CALCIUM CHLORIDE 600; 310; 30; 20 MG/100ML; MG/100ML; MG/100ML; MG/100ML
125 INJECTION, SOLUTION INTRAVENOUS CONTINUOUS
OUTPATIENT
Start: 2025-07-14 | End: 2025-07-14

## 2025-07-14 RX ORDER — SODIUM CHLORIDE 0.9 % (FLUSH) 0.9 %
3 SYRINGE (ML) INJECTION EVERY 12 HOURS SCHEDULED
OUTPATIENT
Start: 2025-07-14

## 2025-07-14 RX ORDER — SODIUM CHLORIDE 9 MG/ML
40 INJECTION, SOLUTION INTRAVENOUS AS NEEDED
OUTPATIENT
Start: 2025-07-14

## 2025-07-14 NOTE — TELEPHONE ENCOUNTER
CALLED PATIENT TO RESCHEDULE HER SURGERY. DATE GIVEN TO PATIENT WAS 9/5/2025. PATIENT IS AWARE AND AGREED. SHE WILL LET HER RIDE KNOW. LETTER WAS SENT VIA [employer], AND MAILED A COPY WITH DATES AND TIMES FOR UPDATED AND RESCHEDULED SURGERY.

## 2025-08-07 ENCOUNTER — TELEPHONE (OUTPATIENT)
Age: 55
End: 2025-08-07
Payer: MEDICARE

## (undated) DEVICE — PREP TRAY WITH CHG: Brand: MEDLINE INDUSTRIES, INC.

## (undated) DEVICE — CYSTO/BLADDER IRRIGATION SET, REGULATING CLAMP

## (undated) DEVICE — DRAPE,UNDERBUTTOCKS,PCH,STERILE: Brand: MEDLINE

## (undated) DEVICE — SOL IRRG H2O PL/BG 1000ML STRL

## (undated) DEVICE — CYSTO PACK: Brand: MEDLINE INDUSTRIES, INC.

## (undated) DEVICE — THE STERILE LIGHT HANDLE COVER IS USED WITH STERIS SURGICAL LIGHTING AND VISUALIZATION SYSTEMS.

## (undated) DEVICE — GLOVE,SURG,SENSICARE SLT,LF,PF,7.5: Brand: MEDLINE